# Patient Record
Sex: FEMALE | Race: WHITE | NOT HISPANIC OR LATINO | Employment: OTHER | URBAN - NONMETROPOLITAN AREA
[De-identification: names, ages, dates, MRNs, and addresses within clinical notes are randomized per-mention and may not be internally consistent; named-entity substitution may affect disease eponyms.]

---

## 2017-02-10 ENCOUNTER — OFFICE VISIT (OUTPATIENT)
Dept: CARDIOLOGY | Facility: CLINIC | Age: 77
End: 2017-02-10

## 2017-02-10 VITALS
OXYGEN SATURATION: 97 % | BODY MASS INDEX: 24.03 KG/M2 | DIASTOLIC BLOOD PRESSURE: 78 MMHG | WEIGHT: 130.6 LBS | HEIGHT: 62 IN | HEART RATE: 82 BPM | SYSTOLIC BLOOD PRESSURE: 124 MMHG

## 2017-02-10 DIAGNOSIS — E78.2 MIXED HYPERLIPIDEMIA: ICD-10-CM

## 2017-02-10 DIAGNOSIS — I10 ESSENTIAL HYPERTENSION: Primary | ICD-10-CM

## 2017-02-10 PROCEDURE — 99213 OFFICE O/P EST LOW 20 MIN: CPT | Performed by: INTERNAL MEDICINE

## 2017-02-10 RX ORDER — VALSARTAN 80 MG/1
80 TABLET ORAL DAILY
Qty: 90 TABLET | Refills: 1 | Status: SHIPPED | OUTPATIENT
Start: 2017-02-10 | End: 2017-02-10 | Stop reason: SDUPTHER

## 2017-02-10 RX ORDER — VALSARTAN 80 MG/1
80 TABLET ORAL DAILY
Qty: 90 TABLET | Refills: 1 | Status: SHIPPED | OUTPATIENT
Start: 2017-02-10 | End: 2017-05-26 | Stop reason: SDUPTHER

## 2017-02-10 NOTE — PROGRESS NOTES
subjective     Chief Complaint   Patient presents with   • Hypertension   • Hyperlipidemia   • Heartburn     History of Present Illness  Jayne Gonzales has long-standing essential hypertension for years. she is taking medications regularly. There are no medication side effects. Blood pressure is very well controlled. There has been no headache nausea chest pain. There has been no syncopal or presyncopal episode. she denies episodes of hypo-tension or accelerated hypertension.    Hyperlipidemia  Jayne Gonzales has long-standing history of hyperlipidemia. Has been trying to lose weight following diet and exercise. Patient is tolerating medications very well. There has been no side effects. Latest lipid levels have been good.    Patient also stated that she used to take the metoprolol with water pill which caused her to have kidney problems.  She is feeling better but still worried about her kidneys.  No recent lab work available.  We will get a copy from patient's PCP    Patient Active Problem List   Diagnosis   • Hypertension   • Hyperlipidemia   • GERD (gastroesophageal reflux disease)   • Arthritis   • Osteoporosis   • Left low back pain       Social History   Substance Use Topics   • Smoking status: Never Smoker   • Smokeless tobacco: Never Used   • Alcohol use No       Allergies   Allergen Reactions   • Codeine    • Sulfa Antibiotics          Current Outpatient Prescriptions:   •  ALPRAZolam (XANAX) 0.25 MG tablet, Take 0.25 mg by mouth at night as needed for anxiety., Disp: , Rfl:   •  atorvastatin (LIPITOR) 10 MG tablet, Take 10 mg by mouth daily., Disp: , Rfl:   •  Calcium-Magnesium-Vitamin D (CALCIUM 500 PO), Take  by mouth., Disp: , Rfl:   •  cholecalciferol (VITAMIN D3) 1000 UNITS tablet, Take 1,000 Units by mouth daily., Disp: , Rfl:   •  Cyanocobalamin (B-12 IJ), Inject  as directed., Disp: , Rfl:   •  HYDROcodone-acetaminophen (NORCO) 5-325 MG per tablet, Take 1 tablet by mouth 2 (two) times a  "day as needed., Disp: , Rfl:   •  ibandronate (BONIVA) 150 MG tablet, Take 150 mg by mouth every 30 (thirty) days., Disp: , Rfl:   •  lansoprazole (PREVACID) 30 MG capsule, Take 30 mg by mouth daily., Disp: , Rfl:   •  meloxicam (MOBIC) 15 MG tablet, Take 15 mg by mouth daily., Disp: , Rfl:   •  metoprolol succinate XL (TOPROL-XL) 25 MG 24 hr tablet, Take 25 mg by mouth daily., Disp: , Rfl:   •  valsartan (DIOVAN) 80 MG tablet, Take 1 tablet by mouth daily., Disp: 90 tablet, Rfl: 1      The following portions of the patient's history were reviewed and updated as appropriate: allergies, current medications, past family history, past medical history, past social history, past surgical history and problem list.    Review of Systems   Constitution: Negative.   HENT: Negative.    Eyes: Negative.    Cardiovascular: Negative.    Respiratory: Negative.    Hematologic/Lymphatic: Negative.    Musculoskeletal: Negative.    Gastrointestinal: Negative.    Neurological: Negative.           Objective:     Visit Vitals   • /78 (BP Location: Left arm, Patient Position: Sitting)   • Pulse 82   • Ht 62\" (157.5 cm)   • Wt 130 lb 9.6 oz (59.2 kg)   • SpO2 97%   • BMI 23.89 kg/m2     Physical Exam   Constitutional: She appears well-developed and well-nourished.   HENT:   Head: Normocephalic and atraumatic.   Mouth/Throat: Oropharynx is clear and moist.   Eyes: Conjunctivae and EOM are normal. Pupils are equal, round, and reactive to light. No scleral icterus.   Neck: Normal range of motion. Neck supple. No JVD present. No tracheal deviation present. No thyromegaly present.   Cardiovascular: Normal rate, regular rhythm, normal heart sounds and intact distal pulses.  Exam reveals no friction rub.    No murmur heard.  Pulmonary/Chest: Effort normal and breath sounds normal. No respiratory distress. She has no wheezes. She has no rales. She exhibits no tenderness.   Abdominal: Soft. Bowel sounds are normal. She exhibits no distension " and no mass. There is no tenderness. There is no rebound and no guarding.   Musculoskeletal: Normal range of motion. She exhibits no edema, tenderness or deformity.   Lymphadenopathy:     She has no cervical adenopathy.   Neurological: She is alert. She has normal reflexes. No cranial nerve deficit. She exhibits normal muscle tone. Coordination normal.   Skin: Skin is warm and dry.   Psychiatric: She has a normal mood and affect. Her behavior is normal. Judgment and thought content normal.         Lab Review  No lab available we will get a copy from patient's PCP    Procedures     I personally viewed and interpreted the patient's LAB data         Assessment:     1. Essential hypertension    2. Mixed hyperlipidemia          Plan:      blood pressure is very well controlled  Diovan prescription was sent  Lipid control is unknown patient is taking Lipitor 10 mg daily we will try to get copy from patient's PCP  Patient is also tolerating metoprolol ER 25 very well.  Aggressive risk factor modifications again discussed        No Follow-up on file.

## 2017-05-26 ENCOUNTER — OFFICE VISIT (OUTPATIENT)
Dept: CARDIOLOGY | Facility: CLINIC | Age: 77
End: 2017-05-26

## 2017-05-26 VITALS
DIASTOLIC BLOOD PRESSURE: 64 MMHG | BODY MASS INDEX: 24.77 KG/M2 | WEIGHT: 134.6 LBS | OXYGEN SATURATION: 95 % | SYSTOLIC BLOOD PRESSURE: 102 MMHG | HEIGHT: 62 IN | HEART RATE: 66 BPM

## 2017-05-26 DIAGNOSIS — K21.9 GASTROESOPHAGEAL REFLUX DISEASE WITHOUT ESOPHAGITIS: ICD-10-CM

## 2017-05-26 DIAGNOSIS — I10 ESSENTIAL HYPERTENSION: ICD-10-CM

## 2017-05-26 DIAGNOSIS — E78.2 MIXED HYPERLIPIDEMIA: Primary | ICD-10-CM

## 2017-05-26 PROCEDURE — 99213 OFFICE O/P EST LOW 20 MIN: CPT | Performed by: INTERNAL MEDICINE

## 2017-05-26 RX ORDER — VALSARTAN 40 MG/1
40 TABLET ORAL DAILY
Qty: 90 TABLET | Refills: 3 | Status: SHIPPED | OUTPATIENT
Start: 2017-05-26 | End: 2017-07-21

## 2017-05-26 RX ORDER — VALSARTAN 80 MG/1
80 TABLET ORAL DAILY
Qty: 90 TABLET | Refills: 1 | Status: SHIPPED | OUTPATIENT
Start: 2017-05-26 | End: 2017-05-26

## 2017-07-12 ENCOUNTER — TRANSCRIBE ORDERS (OUTPATIENT)
Dept: ADMINISTRATIVE | Facility: HOSPITAL | Age: 77
End: 2017-07-12

## 2017-07-12 DIAGNOSIS — Z12.31 VISIT FOR SCREENING MAMMOGRAM: Primary | ICD-10-CM

## 2017-07-21 ENCOUNTER — TELEPHONE (OUTPATIENT)
Dept: CARDIOLOGY | Facility: CLINIC | Age: 77
End: 2017-07-21

## 2017-07-21 RX ORDER — VALSARTAN 80 MG/1
80 TABLET ORAL DAILY
Qty: 90 TABLET | Refills: 0 | Status: SHIPPED | OUTPATIENT
Start: 2017-07-21 | End: 2017-08-07

## 2017-07-21 NOTE — TELEPHONE ENCOUNTER
----- Message from Georgette Pacheco MD sent at 7/21/2017 12:54 PM EDT -----  Increase valsartan to 80 mg daily  ----- Message -----     From: Kayla Engel MA     Sent: 7/21/2017  10:14 AM       To: Georgette Pacheco MD    Pt has been taking valsartan 40mg since last visit and states her b/p has been running very high. PCP gave her hydralazine 10mg qd.   She states these are her last 3 b/p readings at home  213/104  199/85  203/89  Wants to know if she should come in and be seen or go back to taking valsartan 80?

## 2017-07-23 ENCOUNTER — APPOINTMENT (OUTPATIENT)
Dept: CT IMAGING | Facility: HOSPITAL | Age: 77
End: 2017-07-23

## 2017-07-23 ENCOUNTER — APPOINTMENT (OUTPATIENT)
Dept: GENERAL RADIOLOGY | Facility: HOSPITAL | Age: 77
End: 2017-07-23

## 2017-07-23 ENCOUNTER — HOSPITAL ENCOUNTER (EMERGENCY)
Facility: HOSPITAL | Age: 77
Discharge: HOME | End: 2017-07-23
Attending: FAMILY MEDICINE | Admitting: FAMILY MEDICINE

## 2017-07-23 VITALS
OXYGEN SATURATION: 98 % | HEART RATE: 100 BPM | SYSTOLIC BLOOD PRESSURE: 150 MMHG | TEMPERATURE: 98.6 F | WEIGHT: 129 LBS | RESPIRATION RATE: 18 BRPM | HEIGHT: 62 IN | BODY MASS INDEX: 23.74 KG/M2 | DIASTOLIC BLOOD PRESSURE: 73 MMHG

## 2017-07-23 DIAGNOSIS — I10 ELEVATED BLOOD PRESSURE READING WITH DIAGNOSIS OF HYPERTENSION: Primary | ICD-10-CM

## 2017-07-23 DIAGNOSIS — R07.9 CHEST PAIN IN ADULT: ICD-10-CM

## 2017-07-23 LAB
ALBUMIN SERPL-MCNC: 4.4 G/DL (ref 3.4–4.8)
ALBUMIN/GLOB SERPL: 1.5 G/DL (ref 1.5–2.5)
ALP SERPL-CCNC: 56 U/L (ref 35–104)
ALT SERPL W P-5'-P-CCNC: 13 U/L (ref 10–36)
ANION GAP SERPL CALCULATED.3IONS-SCNC: 3.1 MMOL/L (ref 3.6–11.2)
AST SERPL-CCNC: 20 U/L (ref 10–30)
BASOPHILS # BLD AUTO: 0.04 10*3/MM3 (ref 0–0.3)
BASOPHILS NFR BLD AUTO: 0.6 % (ref 0–2)
BILIRUB SERPL-MCNC: 0.3 MG/DL (ref 0.2–1.8)
BNP SERPL-MCNC: 66 PG/ML (ref 0–100)
BUN BLD-MCNC: 14 MG/DL (ref 7–21)
BUN/CREAT SERPL: 13.1 (ref 7–25)
CALCIUM SPEC-SCNC: 9.7 MG/DL (ref 7.7–10)
CHLORIDE SERPL-SCNC: 108 MMOL/L (ref 99–112)
CK MB SERPL-CCNC: 1.99 NG/ML (ref 0–5)
CK MB SERPL-RTO: 3.1 % (ref 0–3)
CK SERPL-CCNC: 64 U/L (ref 24–173)
CO2 SERPL-SCNC: 30.9 MMOL/L (ref 24.3–31.9)
CREAT BLD-MCNC: 1.07 MG/DL (ref 0.43–1.29)
DEPRECATED RDW RBC AUTO: 40 FL (ref 37–54)
EOSINOPHIL # BLD AUTO: 0.13 10*3/MM3 (ref 0–0.7)
EOSINOPHIL NFR BLD AUTO: 1.8 % (ref 0–7)
ERYTHROCYTE [DISTWIDTH] IN BLOOD BY AUTOMATED COUNT: 11.6 % (ref 11.5–14.5)
GFR SERPL CREATININE-BSD FRML MDRD: 50 ML/MIN/1.73
GLOBULIN UR ELPH-MCNC: 2.9 GM/DL
GLUCOSE BLD-MCNC: 112 MG/DL (ref 70–110)
HCT VFR BLD AUTO: 37.5 % (ref 37–47)
HGB BLD-MCNC: 12.1 G/DL (ref 12–16)
HOLD SPECIMEN: NORMAL
HOLD SPECIMEN: NORMAL
IMM GRANULOCYTES # BLD: 0.01 10*3/MM3 (ref 0–0.03)
IMM GRANULOCYTES NFR BLD: 0.1 % (ref 0–0.5)
LYMPHOCYTES # BLD AUTO: 1.96 10*3/MM3 (ref 1–3)
LYMPHOCYTES NFR BLD AUTO: 27.2 % (ref 16–46)
MCH RBC QN AUTO: 31.3 PG (ref 27–33)
MCHC RBC AUTO-ENTMCNC: 32.3 G/DL (ref 33–37)
MCV RBC AUTO: 96.9 FL (ref 80–94)
MONOCYTES # BLD AUTO: 0.71 10*3/MM3 (ref 0.1–0.9)
MONOCYTES NFR BLD AUTO: 9.9 % (ref 0–12)
NEUTROPHILS # BLD AUTO: 4.35 10*3/MM3 (ref 1.4–6.5)
NEUTROPHILS NFR BLD AUTO: 60.4 % (ref 40–75)
OSMOLALITY SERPL CALC.SUM OF ELEC: 284.3 MOSM/KG (ref 273–305)
PLATELET # BLD AUTO: 291 10*3/MM3 (ref 130–400)
PMV BLD AUTO: 10.5 FL (ref 6–10)
POTASSIUM BLD-SCNC: 4.5 MMOL/L (ref 3.5–5.3)
PROT SERPL-MCNC: 7.3 G/DL (ref 6–8)
RBC # BLD AUTO: 3.87 10*6/MM3 (ref 4.2–5.4)
SODIUM BLD-SCNC: 142 MMOL/L (ref 135–153)
TROPONIN I SERPL-MCNC: 0.01 NG/ML
TROPONIN I SERPL-MCNC: <0.006 NG/ML
WBC NRBC COR # BLD: 7.2 10*3/MM3 (ref 4.5–12.5)
WHOLE BLOOD HOLD SPECIMEN: NORMAL
WHOLE BLOOD HOLD SPECIMEN: NORMAL

## 2017-07-23 PROCEDURE — 71020 XR CHEST 2 VW: CPT | Performed by: RADIOLOGY

## 2017-07-23 PROCEDURE — 96374 THER/PROPH/DIAG INJ IV PUSH: CPT

## 2017-07-23 PROCEDURE — 83880 ASSAY OF NATRIURETIC PEPTIDE: CPT | Performed by: FAMILY MEDICINE

## 2017-07-23 PROCEDURE — 96376 TX/PRO/DX INJ SAME DRUG ADON: CPT

## 2017-07-23 PROCEDURE — 93005 ELECTROCARDIOGRAM TRACING: CPT | Performed by: FAMILY MEDICINE

## 2017-07-23 PROCEDURE — 85025 COMPLETE CBC W/AUTO DIFF WBC: CPT | Performed by: FAMILY MEDICINE

## 2017-07-23 PROCEDURE — 71260 CT THORAX DX C+: CPT

## 2017-07-23 PROCEDURE — 71020 HC CHEST PA AND LATERAL: CPT

## 2017-07-23 PROCEDURE — 84484 ASSAY OF TROPONIN QUANT: CPT | Performed by: FAMILY MEDICINE

## 2017-07-23 PROCEDURE — 71260 CT THORAX DX C+: CPT | Performed by: RADIOLOGY

## 2017-07-23 PROCEDURE — 96375 TX/PRO/DX INJ NEW DRUG ADDON: CPT

## 2017-07-23 PROCEDURE — 93010 ELECTROCARDIOGRAM REPORT: CPT | Performed by: INTERNAL MEDICINE

## 2017-07-23 PROCEDURE — 0 IOPAMIDOL 61 % SOLUTION: Performed by: FAMILY MEDICINE

## 2017-07-23 PROCEDURE — 74176 CT ABD & PELVIS W/O CONTRAST: CPT

## 2017-07-23 PROCEDURE — 82550 ASSAY OF CK (CPK): CPT | Performed by: FAMILY MEDICINE

## 2017-07-23 PROCEDURE — 82553 CREATINE MB FRACTION: CPT | Performed by: FAMILY MEDICINE

## 2017-07-23 PROCEDURE — 80053 COMPREHEN METABOLIC PANEL: CPT | Performed by: FAMILY MEDICINE

## 2017-07-23 PROCEDURE — 25010000002 ONDANSETRON PER 1 MG: Performed by: FAMILY MEDICINE

## 2017-07-23 PROCEDURE — 25010000002 HYDRALAZINE PER 20 MG: Performed by: FAMILY MEDICINE

## 2017-07-23 PROCEDURE — 74176 CT ABD & PELVIS W/O CONTRAST: CPT | Performed by: RADIOLOGY

## 2017-07-23 PROCEDURE — 99283 EMERGENCY DEPT VISIT LOW MDM: CPT

## 2017-07-23 RX ORDER — SODIUM CHLORIDE 0.9 % (FLUSH) 0.9 %
10 SYRINGE (ML) INJECTION AS NEEDED
Status: DISCONTINUED | OUTPATIENT
Start: 2017-07-23 | End: 2017-07-24 | Stop reason: HOSPADM

## 2017-07-23 RX ORDER — ONDANSETRON 4 MG/1
4 TABLET, FILM COATED ORAL EVERY 6 HOURS
Qty: 10 TABLET | Refills: 1 | Status: SHIPPED | OUTPATIENT
Start: 2017-07-23

## 2017-07-23 RX ORDER — AZITHROMYCIN 250 MG/1
500 TABLET, FILM COATED ORAL ONCE
Status: COMPLETED | OUTPATIENT
Start: 2017-07-23 | End: 2017-07-23

## 2017-07-23 RX ORDER — PANTOPRAZOLE SODIUM 40 MG/1
40 TABLET, DELAYED RELEASE ORAL DAILY
Qty: 30 TABLET | Refills: 0 | Status: SHIPPED | OUTPATIENT
Start: 2017-07-23 | End: 2017-08-22

## 2017-07-23 RX ORDER — HYDRALAZINE HYDROCHLORIDE 20 MG/ML
20 INJECTION INTRAMUSCULAR; INTRAVENOUS ONCE
Status: COMPLETED | OUTPATIENT
Start: 2017-07-23 | End: 2017-07-23

## 2017-07-23 RX ORDER — FAMOTIDINE 10 MG/ML
20 INJECTION, SOLUTION INTRAVENOUS ONCE
Status: COMPLETED | OUTPATIENT
Start: 2017-07-23 | End: 2017-07-23

## 2017-07-23 RX ORDER — CEFDINIR 300 MG/1
300 CAPSULE ORAL 2 TIMES DAILY
Qty: 20 CAPSULE | Refills: 0 | Status: SHIPPED | OUTPATIENT
Start: 2017-07-23 | End: 2017-08-02

## 2017-07-23 RX ORDER — ASPIRIN 81 MG/1
324 TABLET, CHEWABLE ORAL ONCE
Status: COMPLETED | OUTPATIENT
Start: 2017-07-23 | End: 2017-07-23

## 2017-07-23 RX ORDER — ONDANSETRON 2 MG/ML
4 INJECTION INTRAMUSCULAR; INTRAVENOUS ONCE
Status: COMPLETED | OUTPATIENT
Start: 2017-07-23 | End: 2017-07-23

## 2017-07-23 RX ORDER — FAMOTIDINE 10 MG/ML
20 INJECTION, SOLUTION INTRAVENOUS ONCE
Status: DISCONTINUED | OUTPATIENT
Start: 2017-07-23 | End: 2017-07-23

## 2017-07-23 RX ORDER — HYDRALAZINE HYDROCHLORIDE 20 MG/ML
10 INJECTION INTRAMUSCULAR; INTRAVENOUS ONCE
Status: COMPLETED | OUTPATIENT
Start: 2017-07-23 | End: 2017-07-23

## 2017-07-23 RX ADMIN — IOPAMIDOL 90 ML: 612 INJECTION, SOLUTION INTRAVENOUS at 20:25

## 2017-07-23 RX ADMIN — HYDRALAZINE HYDROCHLORIDE 10 MG: 20 INJECTION INTRAMUSCULAR; INTRAVENOUS at 19:54

## 2017-07-23 RX ADMIN — HYDRALAZINE HYDROCHLORIDE 20 MG: 20 INJECTION INTRAMUSCULAR; INTRAVENOUS at 20:35

## 2017-07-23 RX ADMIN — AZITHROMYCIN 500 MG: 250 TABLET, FILM COATED ORAL at 21:38

## 2017-07-23 RX ADMIN — ASPIRIN 324 MG: 81 TABLET, CHEWABLE ORAL at 20:30

## 2017-07-23 RX ADMIN — FAMOTIDINE 20 MG: 10 INJECTION, SOLUTION INTRAVENOUS at 21:18

## 2017-07-23 RX ADMIN — ONDANSETRON 4 MG: 2 INJECTION, SOLUTION INTRAMUSCULAR; INTRAVENOUS at 21:18

## 2017-07-23 NOTE — ED PROVIDER NOTES
Subjective   History of Present Illness  76 y/o F here w/h/o HTN and HLD and new onset of substernal CP. Pt states that her BP has been running high and she has had her medications changed recently as well. Pt states that her CP started earlier today. Pt is currently on metoprolol and diovan for BP control. Pt states that she also takes hydralazine 10 mg TID that was started by her PCP one week PTP, last dose of hydralazine was this AM. Pt denies any numbness/tingling. No vision changes, no HA.  Review of Systems   Constitutional: Negative for chills, diaphoresis, fatigue and fever.   Eyes: Negative for photophobia and visual disturbance.   Respiratory: Negative for cough, chest tightness, shortness of breath and wheezing.    Cardiovascular: Positive for chest pain. Negative for palpitations and leg swelling.        +elevated BP readings with a h/o HTN   Gastrointestinal: Negative for abdominal distention, abdominal pain, nausea and vomiting.   Genitourinary: Negative for difficulty urinating, dysuria, flank pain, frequency and urgency.   Musculoskeletal: Negative for neck pain and neck stiffness.   Skin: Negative for color change and pallor.   All other systems reviewed and are negative.      Past Medical History:   Diagnosis Date   • Cancer     skin   • GERD (gastroesophageal reflux disease)    • Hyperlipidemia    • Hypertension        Allergies   Allergen Reactions   • Codeine    • Sulfa Antibiotics        Past Surgical History:   Procedure Laterality Date   • BREAST LUMPECTOMY     • HYSTERECTOMY     • SKIN CANCER EXCISION         Family History   Problem Relation Age of Onset   • Heart attack Mother    • Heart disease Mother    • Heart failure Mother    • Heart attack Father    • Heart disease Father    • Heart failure Father    • No Known Problems Sister    • Emphysema Brother        Social History     Social History   • Marital status:      Spouse name: N/A   • Number of children: N/A   • Years of  education: N/A     Social History Main Topics   • Smoking status: Never Smoker   • Smokeless tobacco: Never Used   • Alcohol use No   • Drug use: No   • Sexual activity: Not Asked     Other Topics Concern   • None     Social History Narrative           Objective   Physical Exam   Constitutional: She is oriented to person, place, and time. She appears well-developed and well-nourished. She is active.   HENT:   Head: Normocephalic and atraumatic.   Right Ear: Hearing and external ear normal.   Left Ear: Hearing and external ear normal.   Nose: Nose normal.   Mouth/Throat: Uvula is midline, oropharynx is clear and moist and mucous membranes are normal.   Eyes: Conjunctivae, EOM and lids are normal. Pupils are equal, round, and reactive to light.   Neck: Trachea normal, normal range of motion, full passive range of motion without pain and phonation normal. Neck supple.   Cardiovascular: Normal rate, regular rhythm and normal heart sounds.    +elevated BP reading   Pulmonary/Chest: Effort normal and breath sounds normal.   Abdominal: Soft. Normal appearance. There is no tenderness.   Neurological: She is alert and oriented to person, place, and time. GCS eye subscore is 4. GCS verbal subscore is 5. GCS motor subscore is 6.   Skin: Skin is warm, dry and intact.   Psychiatric: She has a normal mood and affect. Her speech is normal and behavior is normal. Judgment and thought content normal. Cognition and memory are normal.   Nursing note and vitals reviewed.      Procedures         ED Course  ED Course   Comment By Time   NSR, 79 bpm. QTc 421ms. No ST segment abnormalities concerning for STEMI. Quinton Schaffer MD 07/23 1851      EKG negative for STEMI and pt's Tn negative x 2. Pt CP completely resolved shortly after presentation to the ER. Pt with CT chest findings concerning for RML pneumonia. Pt started on azithromycin here and sent home with prescription. Pt notified on CT abd/pelvis results and need to f/u  with GI. Pt also informed of need to undergo outpt stress test since she refuses admission and f/u with Dr Pacheco who is managing her HTN. Pt BP decreased to 148/81 on exam immediately prior to discharge.            MDM  Number of Diagnoses or Management Options  Chest pain in adult: new and requires workup  Elevated blood pressure reading with diagnosis of hypertension: new and requires workup     Amount and/or Complexity of Data Reviewed  Clinical lab tests: reviewed and ordered  Tests in the radiology section of CPT®: reviewed and ordered  Tests in the medicine section of CPT®: reviewed and ordered  Decide to obtain previous medical records or to obtain history from someone other than the patient: yes  Independent visualization of images, tracings, or specimens: yes    Risk of Complications, Morbidity, and/or Mortality  Presenting problems: high  Diagnostic procedures: high  Management options: high    Patient Progress  Patient progress: stable      Final diagnoses:   Elevated blood pressure reading with diagnosis of hypertension   Chest pain in adult            Quinton Schaffer MD  07/23/17 2412

## 2017-07-24 ENCOUNTER — OFFICE VISIT (OUTPATIENT)
Dept: CARDIOLOGY | Facility: CLINIC | Age: 77
End: 2017-07-24

## 2017-07-24 VITALS
WEIGHT: 131.2 LBS | SYSTOLIC BLOOD PRESSURE: 102 MMHG | DIASTOLIC BLOOD PRESSURE: 50 MMHG | HEART RATE: 81 BPM | BODY MASS INDEX: 24.14 KG/M2 | OXYGEN SATURATION: 98 % | HEIGHT: 62 IN

## 2017-07-24 DIAGNOSIS — E78.2 MIXED HYPERLIPIDEMIA: ICD-10-CM

## 2017-07-24 DIAGNOSIS — R07.9 CHEST PAIN IN ADULT: ICD-10-CM

## 2017-07-24 DIAGNOSIS — I10 ESSENTIAL HYPERTENSION: Primary | ICD-10-CM

## 2017-07-24 PROCEDURE — 99213 OFFICE O/P EST LOW 20 MIN: CPT | Performed by: INTERNAL MEDICINE

## 2017-07-24 PROCEDURE — 93000 ELECTROCARDIOGRAM COMPLETE: CPT | Performed by: INTERNAL MEDICINE

## 2017-07-24 NOTE — PROGRESS NOTES
subjective     Chief Complaint   Patient presents with   • Follow-up     ER FOLLOW UP FOR BP   • Hypertension   • Hyperlipidemia   • Dizziness     History of Present Illness   Patient states that yesterday she had to go to the emergency room because she was having mild fullness in the chest and blood pressure was elevated.  In the emergency room her EKG and cardiac enzymes were normal.  She was told that she may have bronchitis and was sent home on antibiotics.  She was also told to see Dr. Kelly because of nausea and was given Zofran and Protonix.  Patient did not start any of those medications.  She also was given hydralazine to bring the blood pressure down.  Today blood pressure is actually low and patient is asymptomatic.  Patient takes hydralazine 10 mg 3 times a day only on when necessary basis.    Hyperlipidemia  Jayne Gonzales has long-standing history of hyperlipidemia. Has been trying to lose weight following diet and exercise. Patient is tolerating medications very well. There has been no side effects. Latest lipid levels have been good.       Past Surgical History:   Procedure Laterality Date   • BREAST LUMPECTOMY     • CARDIOVASCULAR STRESS TEST  02/10/2016   • ECHO - CONVERTED  02/10/2016   • HYSTERECTOMY     • SKIN CANCER EXCISION       Family History   Problem Relation Age of Onset   • Heart attack Mother    • Heart disease Mother    • Heart failure Mother    • Heart attack Father    • Heart disease Father    • Heart failure Father    • No Known Problems Sister    • Emphysema Brother      Past Medical History:   Diagnosis Date   • Cancer     skin   • GERD (gastroesophageal reflux disease)    • Hyperlipidemia    • Hypertension      Patient Active Problem List   Diagnosis   • Hypertension   • Hyperlipidemia   • GERD (gastroesophageal reflux disease)   • Arthritis   • Osteoporosis   • Left low back pain       Social History   Substance Use Topics   • Smoking status: Never Smoker   • Smokeless  tobacco: Never Used   • Alcohol use No       Allergies   Allergen Reactions   • Codeine    • Sulfa Antibiotics        Current Outpatient Prescriptions on File Prior to Visit   Medication Sig   • ALPRAZolam (XANAX) 0.25 MG tablet Take 0.25 mg by mouth at night as needed for anxiety.   • atorvastatin (LIPITOR) 10 MG tablet Take 10 mg by mouth daily.   • Calcium-Magnesium-Vitamin D (CALCIUM 500 PO) Take  by mouth.   • cefdinir (OMNICEF) 300 MG capsule Take 1 capsule by mouth 2 (Two) Times a Day for 10 days.   • cholecalciferol (VITAMIN D3) 1000 UNITS tablet Take 1,000 Units by mouth daily.   • Cyanocobalamin (B-12 IJ) Inject  as directed.   • HYDROcodone-acetaminophen (NORCO) 5-325 MG per tablet Take 1 tablet by mouth 2 (two) times a day as needed.   • lansoprazole (PREVACID) 30 MG capsule Take 30 mg by mouth daily.   • meloxicam (MOBIC) 15 MG tablet Take 15 mg by mouth daily.   • metoprolol succinate XL (TOPROL-XL) 25 MG 24 hr tablet Take 25 mg by mouth daily.   • ondansetron (ZOFRAN) 4 MG tablet Take 1 tablet by mouth Every 6 (Six) Hours. PRN Nausea/vomiting   • valsartan (DIOVAN) 80 MG tablet Take 1 tablet by mouth Daily. (Patient taking differently: Take 80 mg by mouth Daily. Pt reports she takes 80mg in the morning and 40mg at night)   • ibandronate (BONIVA) 150 MG tablet Take 150 mg by mouth every 30 (thirty) days.   • pantoprazole (PROTONIX) 40 MG EC tablet Take 1 tablet by mouth Daily for 30 days.     No current facility-administered medications on file prior to visit.          The following portions of the patient's history were reviewed and updated as appropriate: allergies, current medications, past family history, past medical history, past social history, past surgical history and problem list.    Review of Systems   Constitution: Negative.   HENT: Negative.  Negative for congestion and headaches.    Eyes: Negative.    Cardiovascular: Negative.  Negative for chest pain, cyanosis, dyspnea on exertion,  "irregular heartbeat, leg swelling, near-syncope, orthopnea, palpitations, paroxysmal nocturnal dyspnea and syncope.   Respiratory: Negative.  Negative for shortness of breath.    Hematologic/Lymphatic: Negative.    Musculoskeletal: Negative.    Gastrointestinal: Negative.    Neurological: Negative.           Objective:     /50 (BP Location: Left arm, Patient Position: Sitting)  Pulse 81  Ht 62\" (157.5 cm)  Wt 131 lb 3.2 oz (59.5 kg)  SpO2 98%  BMI 24 kg/m2  Physical Exam   Constitutional: She appears well-developed and well-nourished.   HENT:   Head: Normocephalic and atraumatic.   Mouth/Throat: Oropharynx is clear and moist.   Eyes: Conjunctivae and EOM are normal. Pupils are equal, round, and reactive to light. No scleral icterus.   Neck: Normal range of motion. Neck supple. No JVD present. No tracheal deviation present. No thyromegaly present.   Cardiovascular: Normal rate, regular rhythm, normal heart sounds and intact distal pulses.  Exam reveals no friction rub.    No murmur heard.  Pulmonary/Chest: Effort normal and breath sounds normal. No respiratory distress. She has no wheezes. She has no rales. She exhibits no tenderness.   Abdominal: Soft. Bowel sounds are normal. She exhibits no distension and no mass. There is no tenderness. There is no rebound and no guarding.   Musculoskeletal: Normal range of motion. She exhibits no edema, tenderness or deformity.   Lymphadenopathy:     She has no cervical adenopathy.   Neurological: She is alert. She has normal reflexes. No cranial nerve deficit. She exhibits normal muscle tone. Coordination normal.   Skin: Skin is warm and dry.   Psychiatric: She has a normal mood and affect. Her behavior is normal. Judgment and thought content normal.         Lab Review  Lab Results   Component Value Date     07/23/2017    K 4.5 07/23/2017     07/23/2017    BUN 14 07/23/2017    CREATININE 1.07 07/23/2017    GLUCOSE 112 (H) 07/23/2017    CALCIUM 9.7 " 07/23/2017    ALT 13 07/23/2017    ALKPHOS 56 07/23/2017    LABIL2 1.5 07/23/2017     Lab Results   Component Value Date    CKTOTAL 64 07/23/2017     Lab Results   Component Value Date    WBC 7.20 07/23/2017    HGB 12.1 07/23/2017    HCT 37.5 07/23/2017     07/23/2017     No results found for: INR  No results found for: MG  No results found for: PSA, TSH  Lab Results   Component Value Date    BNP 66.0 07/23/2017   Lipid panel February 17        ECG 12 Lead  Date/Time: 7/25/2017 12:37 PM  Performed by: YAEL RAO  Authorized by: YAEL RAO   Rhythm: sinus rhythm  Rate: normal  Conduction: conduction normal  ST Segments: ST segments normal  T Waves: T waves normal  QRS axis: normal  Clinical impression: abnormal ECG and myocardial infarction  Comments: 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  No acute changes               I personally viewed and interpreted the patient's LAB data         Assessment:     1. Essential hypertension    2. Chest pain in adult    3. Mixed hyperlipidemia          Plan:      Blood pressure is very well controlled today.  She will continue current medications  Because of chest pain further cardiac workup was indicated.  A treadmill stress test has been scheduled.  Patient will return to the emergency room if she develops chest pain        No Follow-up on file.

## 2017-07-25 ENCOUNTER — TRANSCRIBE ORDERS (OUTPATIENT)
Dept: ADMINISTRATIVE | Facility: HOSPITAL | Age: 77
End: 2017-07-25

## 2017-07-25 DIAGNOSIS — R07.9 CHEST PAIN, UNSPECIFIED: Primary | ICD-10-CM

## 2017-08-01 ENCOUNTER — HOSPITAL ENCOUNTER (OUTPATIENT)
Dept: CARDIOLOGY | Facility: HOSPITAL | Age: 77
Discharge: HOME | End: 2017-08-01
Attending: INTERNAL MEDICINE

## 2017-08-01 ENCOUNTER — TELEPHONE (OUTPATIENT)
Dept: CARDIOLOGY | Facility: CLINIC | Age: 77
End: 2017-08-01

## 2017-08-01 DIAGNOSIS — R07.9 CHEST PAIN, UNSPECIFIED: ICD-10-CM

## 2017-08-01 DIAGNOSIS — R07.9 CHEST PAIN IN ADULT: Primary | ICD-10-CM

## 2017-08-01 NOTE — TELEPHONE ENCOUNTER
Tech called states Ms. Gonzales went for her stress test this morning and could not get b/p down enough to do walking stress test. linwood ordered by Dr. Pacheco per Radha

## 2017-08-03 ENCOUNTER — HOSPITAL ENCOUNTER (OUTPATIENT)
Dept: CARDIOLOGY | Facility: HOSPITAL | Age: 77
Discharge: HOME | End: 2017-08-03
Attending: INTERNAL MEDICINE

## 2017-08-03 ENCOUNTER — HOSPITAL ENCOUNTER (OUTPATIENT)
Dept: NUCLEAR MEDICINE | Facility: HOSPITAL | Age: 77
Discharge: HOME | End: 2017-08-03
Attending: INTERNAL MEDICINE

## 2017-08-03 DIAGNOSIS — R07.9 CHEST PAIN IN ADULT: ICD-10-CM

## 2017-08-03 PROCEDURE — 25010000002 AMINOPHYLLINE PER 250 MG: Performed by: INTERNAL MEDICINE

## 2017-08-03 PROCEDURE — 0 TECHNETIUM SESTAMIBI: Performed by: INTERNAL MEDICINE

## 2017-08-03 PROCEDURE — 78452 HT MUSCLE IMAGE SPECT MULT: CPT | Performed by: INTERNAL MEDICINE

## 2017-08-03 PROCEDURE — A9500 TC99M SESTAMIBI: HCPCS | Performed by: INTERNAL MEDICINE

## 2017-08-03 PROCEDURE — 78452 HT MUSCLE IMAGE SPECT MULT: CPT

## 2017-08-03 PROCEDURE — 93018 CV STRESS TEST I&R ONLY: CPT | Performed by: INTERNAL MEDICINE

## 2017-08-03 PROCEDURE — 93017 CV STRESS TEST TRACING ONLY: CPT

## 2017-08-03 PROCEDURE — 25010000002 REGADENOSON 0.4 MG/5ML SOLUTION: Performed by: INTERNAL MEDICINE

## 2017-08-03 RX ORDER — AMINOPHYLLINE DIHYDRATE 25 MG/ML
125 INJECTION, SOLUTION INTRAVENOUS
Status: COMPLETED | OUTPATIENT
Start: 2017-08-03 | End: 2017-08-03

## 2017-08-03 RX ADMIN — REGADENOSON 0.4 MG: 0.08 INJECTION, SOLUTION INTRAVENOUS at 10:06

## 2017-08-03 RX ADMIN — TECHNETIUM TC-99M SESTAMIBI 1 DOSE: 1 INJECTION INTRAVENOUS at 07:55

## 2017-08-03 RX ADMIN — TECHNETIUM TC-99M SESTAMIBI 1 DOSE: 1 INJECTION INTRAVENOUS at 10:06

## 2017-08-03 RX ADMIN — AMINOPHYLLINE 125 MG: 25 INJECTION, SOLUTION INTRAVENOUS at 10:13

## 2017-08-04 ENCOUNTER — TELEPHONE (OUTPATIENT)
Dept: CARDIOLOGY | Facility: CLINIC | Age: 77
End: 2017-08-04

## 2017-08-04 LAB
BH CV NUCLEAR PRIOR STUDY: 3
BH CV STRESS BP STAGE 1: NORMAL
BH CV STRESS BP STAGE 2: NORMAL
BH CV STRESS COMMENTS STAGE 1: NORMAL
BH CV STRESS COMMENTS STAGE 2: NORMAL
BH CV STRESS DOSE REGADENOSON STAGE 1: 0.4
BH CV STRESS DURATION MIN STAGE 1: 0
BH CV STRESS DURATION MIN STAGE 2: 4
BH CV STRESS DURATION SEC STAGE 1: 15
BH CV STRESS DURATION SEC STAGE 2: 0
BH CV STRESS HR STAGE 1: 84
BH CV STRESS HR STAGE 2: 62
BH CV STRESS PROTOCOL 1: NORMAL
BH CV STRESS RECOVERY BP: NORMAL MMHG
BH CV STRESS RECOVERY HR: 63 BPM
BH CV STRESS STAGE 1: 1
BH CV STRESS STAGE 2: 2
MAXIMAL PREDICTED HEART RATE: 142 BPM
PERCENT MAX PREDICTED HR: 59.15 %
STRESS BASELINE BP: NORMAL MMHG
STRESS BASELINE HR: 57 BPM
STRESS PERCENT HR: 70 %
STRESS POST PEAK BP: NORMAL MMHG
STRESS POST PEAK HR: 84 BPM
STRESS TARGET HR: 121 BPM

## 2017-08-04 NOTE — TELEPHONE ENCOUNTER
Stress test is normal.  Keep taking all the medications.  Add aspirin 81 daily if not taking any aspirin

## 2017-08-07 ENCOUNTER — TELEPHONE (OUTPATIENT)
Dept: CARDIOLOGY | Facility: CLINIC | Age: 77
End: 2017-08-07

## 2017-08-07 RX ORDER — HYDRALAZINE HYDROCHLORIDE 10 MG/1
20 TABLET, FILM COATED ORAL 3 TIMES DAILY
Qty: 180 TABLET | Refills: 0 | Status: SHIPPED | OUTPATIENT
Start: 2017-08-07 | End: 2017-08-16 | Stop reason: SDUPTHER

## 2017-08-07 RX ORDER — VALSARTAN 80 MG/1
80 TABLET ORAL 2 TIMES DAILY
Qty: 60 TABLET | Refills: 0 | Status: SHIPPED | OUTPATIENT
Start: 2017-08-07 | End: 2017-08-16 | Stop reason: SDUPTHER

## 2017-08-07 NOTE — TELEPHONE ENCOUNTER
----- Message from Georgette Pacheco MD sent at 8/4/2017  4:17 PM EDT -----  Increase Diovan 80 mg twice a day and increase hydralazine 20 milligrams 3 times a day  ----- Message -----     From: Kayla Engel MA     Sent: 8/4/2017   1:48 PM       To: Georgette Pacheco MD    Pt states her b/p has been running high in the mornings. She takes diovan 80mg in the morning and 40mg in the evening. She is also taking hydralazine 10 tid that is not on her med list. 175/82 200/107 these were her readings the last two mornings before she takes her meds

## 2017-08-16 RX ORDER — VALSARTAN 80 MG/1
80 TABLET ORAL 2 TIMES DAILY
Qty: 180 TABLET | Refills: 0 | Status: SHIPPED | OUTPATIENT
Start: 2017-08-16 | End: 2017-11-10 | Stop reason: SDUPTHER

## 2017-08-16 RX ORDER — HYDRALAZINE HYDROCHLORIDE 10 MG/1
20 TABLET, FILM COATED ORAL 3 TIMES DAILY
Qty: 180 TABLET | Refills: 0 | Status: SHIPPED | OUTPATIENT
Start: 2017-08-16 | End: 2017-11-10 | Stop reason: SDUPTHER

## 2017-11-10 ENCOUNTER — OFFICE VISIT (OUTPATIENT)
Dept: CARDIOLOGY | Facility: CLINIC | Age: 77
End: 2017-11-10

## 2017-11-10 VITALS
WEIGHT: 135 LBS | HEART RATE: 70 BPM | HEIGHT: 62 IN | SYSTOLIC BLOOD PRESSURE: 120 MMHG | DIASTOLIC BLOOD PRESSURE: 78 MMHG | OXYGEN SATURATION: 97 % | BODY MASS INDEX: 24.84 KG/M2

## 2017-11-10 DIAGNOSIS — E78.2 MIXED HYPERLIPIDEMIA: ICD-10-CM

## 2017-11-10 DIAGNOSIS — I10 ESSENTIAL HYPERTENSION: Primary | ICD-10-CM

## 2017-11-10 DIAGNOSIS — R07.89 ATYPICAL CHEST PAIN: ICD-10-CM

## 2017-11-10 PROCEDURE — 99213 OFFICE O/P EST LOW 20 MIN: CPT | Performed by: INTERNAL MEDICINE

## 2017-11-10 RX ORDER — HYDRALAZINE HYDROCHLORIDE 10 MG/1
20 TABLET, FILM COATED ORAL 3 TIMES DAILY
Qty: 180 TABLET | Refills: 2 | Status: SHIPPED | OUTPATIENT
Start: 2017-11-10 | End: 2018-01-30 | Stop reason: SDUPTHER

## 2017-11-10 RX ORDER — METOPROLOL SUCCINATE 25 MG/1
25 TABLET, EXTENDED RELEASE ORAL DAILY
Qty: 90 TABLET | Refills: 0 | Status: SHIPPED | OUTPATIENT
Start: 2017-11-10 | End: 2018-10-15

## 2017-11-10 RX ORDER — VALSARTAN 80 MG/1
80 TABLET ORAL 2 TIMES DAILY
Qty: 180 TABLET | Refills: 0 | Status: SHIPPED | OUTPATIENT
Start: 2017-11-10 | End: 2018-10-05

## 2017-11-10 NOTE — PROGRESS NOTES
subjective     Chief Complaint   Patient presents with   • Follow-up   • Hypertension   • Hyperlipidemia     History of Present Illness    Patient is 77 years old white female who recently had cardiac workup.  About 3 months ago stress test was felt to be negative for significant exercise-induced myocardial ischemia with normal LV ejection fraction of 70%    Patient currently is doing very well.  She is following closely with the her PCP landen MASON  Lead pressure has been very well controlled.  She is taking Diovan and metoprolol and hydralazine  Patient wants medications called in for 90 day supply to [facility].  There are no drug side effects.  Blood pressure has been running normal.  With heart rate around 78    Past Surgical History:   Procedure Laterality Date   • BREAST LUMPECTOMY     • CARDIOVASCULAR STRESS TEST  02/10/2016   • ECHO - CONVERTED  02/10/2016   • HYSTERECTOMY     • SKIN CANCER EXCISION       Family History   Problem Relation Age of Onset   • Heart attack Mother    • Heart disease Mother    • Heart failure Mother    • Heart attack Father    • Heart disease Father    • Heart failure Father    • No Known Problems Sister    • Emphysema Brother      Past Medical History:   Diagnosis Date   • Cancer     skin   • GERD (gastroesophageal reflux disease)    • Hyperlipidemia    • Hypertension      Patient Active Problem List   Diagnosis   • Hypertension   • Hyperlipidemia   • GERD (gastroesophageal reflux disease)   • Arthritis   • Osteoporosis   • Left low back pain   • Atypical chest pain       Social History   Substance Use Topics   • Smoking status: Never Smoker   • Smokeless tobacco: Never Used   • Alcohol use No       Allergies   Allergen Reactions   • Codeine    • Sulfa Antibiotics        Current Outpatient Prescriptions on File Prior to Visit   Medication Sig   • ALPRAZolam (XANAX) 0.25 MG tablet Take 0.25 mg by mouth at night as needed for anxiety.   • aspirin 81 MG tablet Take 1 tablet by  "mouth Daily.   • atorvastatin (LIPITOR) 10 MG tablet Take 10 mg by mouth daily.   • Calcium-Magnesium-Vitamin D (CALCIUM 500 PO) Take  by mouth.   • cholecalciferol (VITAMIN D3) 1000 UNITS tablet Take 1,000 Units by mouth daily.   • Cyanocobalamin (B-12 IJ) Inject  as directed.   • HYDROcodone-acetaminophen (NORCO) 5-325 MG per tablet Take 1 tablet by mouth 2 (two) times a day as needed.   • ibandronate (BONIVA) 150 MG tablet Take 150 mg by mouth every 30 (thirty) days.   • meloxicam (MOBIC) 15 MG tablet Take 15 mg by mouth daily.   • ondansetron (ZOFRAN) 4 MG tablet Take 1 tablet by mouth Every 6 (Six) Hours. PRN Nausea/vomiting     No current facility-administered medications on file prior to visit.          The following portions of the patient's history were reviewed and updated as appropriate: allergies, current medications, past family history, past medical history, past social history, past surgical history and problem list.    Review of Systems   Constitution: Negative.   HENT: Negative.  Negative for congestion.    Eyes: Negative.    Cardiovascular: Negative.  Negative for chest pain, cyanosis, dyspnea on exertion, irregular heartbeat, leg swelling, near-syncope, orthopnea, palpitations, paroxysmal nocturnal dyspnea and syncope.   Respiratory: Negative.  Negative for shortness of breath.    Hematologic/Lymphatic: Negative.    Musculoskeletal: Negative.    Gastrointestinal: Negative.    Neurological: Negative.  Negative for headaches.          Objective:     /78 (BP Location: Left arm, Patient Position: Sitting)  Pulse 70  Ht 62\" (157.5 cm)  Wt 135 lb (61.2 kg)  SpO2 97%  BMI 24.69 kg/m2  Physical Exam   Constitutional: She appears well-developed and well-nourished. No distress.   HENT:   Head: Normocephalic and atraumatic.   Mouth/Throat: Oropharynx is clear and moist. No oropharyngeal exudate.   Eyes: Conjunctivae and EOM are normal. Pupils are equal, round, and reactive to light. No scleral " icterus.   Neck: Normal range of motion. Neck supple. No JVD present. No tracheal deviation present. No thyromegaly present.   Cardiovascular: Normal rate, regular rhythm, normal heart sounds and intact distal pulses.  PMI is not displaced.  Exam reveals no gallop, no friction rub and no decreased pulses.    No murmur heard.  Pulses:       Carotid pulses are 3+ on the right side, and 3+ on the left side.       Radial pulses are 3+ on the right side, and 3+ on the left side.   Pulmonary/Chest: Effort normal and breath sounds normal. No respiratory distress. She has no wheezes. She has no rales. She exhibits no tenderness.   Abdominal: Soft. Bowel sounds are normal. She exhibits no distension, no abdominal bruit and no mass. There is no splenomegaly or hepatomegaly. There is no tenderness. There is no rebound and no guarding.   Musculoskeletal: Normal range of motion. She exhibits no edema, tenderness or deformity.   Lymphadenopathy:     She has no cervical adenopathy.   Neurological: She is alert. She has normal reflexes. No cranial nerve deficit. She exhibits normal muscle tone. Coordination normal.   Skin: Skin is warm and dry. No rash noted. She is not diaphoretic. No erythema.   Psychiatric: She has a normal mood and affect. Her behavior is normal. Judgment and thought content normal.         Lab Review  Lab Results   Component Value Date     07/23/2017    K 4.5 07/23/2017     07/23/2017    BUN 14 07/23/2017    CREATININE 1.07 07/23/2017    GLUCOSE 112 (H) 07/23/2017    CALCIUM 9.7 07/23/2017    ALT 13 07/23/2017    ALKPHOS 56 07/23/2017    LABIL2 1.5 07/23/2017     Lab Results   Component Value Date    CKTOTAL 64 07/23/2017     Lab Results   Component Value Date    WBC 7.20 07/23/2017    HGB 12.1 07/23/2017    HCT 37.5 07/23/2017     07/23/2017     No results found for: INR  No results found for: MG  No results found for: PSA, TSH  Lab Results   Component Value Date    BNP 66.0 07/23/2017      No results found for: CHLPL, CHOL, TRIG, HDL, LDLCALC, VLDL, LDLHDL      Procedures       I personally viewed and interpreted the patient's LAB data         Assessment:     1. Essential hypertension    2. Atypical chest pain    3. Mixed hyperlipidemia          Plan:      Patient sees be doing very well.  Blood pressure is very well controlled.  Refills of medications were given.  Cardiac workup discussed with the patient results of stress test explained.  I feel the patient has no significant coronary artery disease at this time.  Patient does have multiple cardiac risk factors.  Aggressive risk factor modification emphasized.  Healthy lifestyle discussed.  Follow-up in 6 months scheduled.        No Follow-up on file.

## 2017-11-12 PROBLEM — R07.89 ATYPICAL CHEST PAIN: Status: ACTIVE | Noted: 2017-11-12

## 2018-01-31 RX ORDER — HYDRALAZINE HYDROCHLORIDE 10 MG/1
TABLET, FILM COATED ORAL
Qty: 540 TABLET | Refills: 2 | Status: SHIPPED | OUTPATIENT
Start: 2018-01-31 | End: 2018-05-25 | Stop reason: SDUPTHER

## 2018-02-09 ENCOUNTER — OFFICE VISIT (OUTPATIENT)
Dept: CARDIOLOGY | Facility: CLINIC | Age: 78
End: 2018-02-09

## 2018-02-09 VITALS
DIASTOLIC BLOOD PRESSURE: 70 MMHG | HEART RATE: 71 BPM | HEIGHT: 62 IN | BODY MASS INDEX: 24.29 KG/M2 | WEIGHT: 132 LBS | OXYGEN SATURATION: 96 % | RESPIRATION RATE: 16 BRPM | SYSTOLIC BLOOD PRESSURE: 118 MMHG

## 2018-02-09 DIAGNOSIS — I10 ESSENTIAL HYPERTENSION: Primary | ICD-10-CM

## 2018-02-09 DIAGNOSIS — E78.2 MIXED HYPERLIPIDEMIA: ICD-10-CM

## 2018-02-09 PROCEDURE — 99213 OFFICE O/P EST LOW 20 MIN: CPT | Performed by: INTERNAL MEDICINE

## 2018-02-09 NOTE — PROGRESS NOTES
subjective     Chief Complaint   Patient presents with   • Hypertension   • Hyperlipidemia   • Heartburn     History of Present Illness    Patient is 77 years old white female who has history of hypertension and hyperlipidemia.  She is here for follow-up.  Patient is currently taking hydralazine, Toprol-XL and Diovan.  Blood pressure is very well controlled.  It's around 120/70.  Heart rate is around 70/m.  Patient denies any chest pain palpitations or shortness of breath.    She also has hyperlipidemia and is taking Lipitor.  She is following very closely with the her PCP landen daily.  Patient has no new complaints today.  Past Surgical History:   Procedure Laterality Date   • BREAST LUMPECTOMY     • CARDIOVASCULAR STRESS TEST  02/10/2016   • ECHO - CONVERTED  02/10/2016   • HYSTERECTOMY     • SKIN CANCER EXCISION       Family History   Problem Relation Age of Onset   • Heart attack Mother    • Heart disease Mother    • Heart failure Mother    • Heart attack Father    • Heart disease Father    • Heart failure Father    • No Known Problems Sister    • Emphysema Brother      Past Medical History:   Diagnosis Date   • Cancer     skin   • GERD (gastroesophageal reflux disease)    • Hyperlipidemia    • Hypertension      Patient Active Problem List   Diagnosis   • Hypertension   • Hyperlipidemia   • GERD (gastroesophageal reflux disease)   • Arthritis   • Osteoporosis   • Left low back pain   • Atypical chest pain       Social History   Substance Use Topics   • Smoking status: Never Smoker   • Smokeless tobacco: Never Used   • Alcohol use No       Allergies   Allergen Reactions   • Codeine    • Sulfa Antibiotics        Current Outpatient Prescriptions on File Prior to Visit   Medication Sig   • ALPRAZolam (XANAX) 0.25 MG tablet Take 0.25 mg by mouth at night as needed for anxiety.   • aspirin 81 MG tablet Take 1 tablet by mouth Daily.   • atorvastatin (LIPITOR) 10 MG tablet Take 10 mg by mouth daily.   •  "Calcium-Magnesium-Vitamin D (CALCIUM 500 PO) Take  by mouth.   • cholecalciferol (VITAMIN D3) 1000 UNITS tablet Take 1,000 Units by mouth daily.   • Cyanocobalamin (B-12 IJ) Inject  as directed.   • hydrALAZINE (APRESOLINE) 10 MG tablet TAKE 2 TABLETS THREE TIMES DAILY   • HYDROcodone-acetaminophen (NORCO) 5-325 MG per tablet Take 1 tablet by mouth 2 (two) times a day as needed.   • ibandronate (BONIVA) 150 MG tablet Take 150 mg by mouth every 30 (thirty) days.   • meloxicam (MOBIC) 15 MG tablet Take 15 mg by mouth daily.   • metoprolol succinate XL (TOPROL-XL) 25 MG 24 hr tablet Take 1 tablet by mouth Daily.   • ondansetron (ZOFRAN) 4 MG tablet Take 1 tablet by mouth Every 6 (Six) Hours. PRN Nausea/vomiting   • valsartan (DIOVAN) 80 MG tablet Take 1 tablet by mouth 2 (Two) Times a Day.     No current facility-administered medications on file prior to visit.          The following portions of the patient's history were reviewed and updated as appropriate: allergies, current medications, past family history, past medical history, past social history, past surgical history and problem list.    Review of Systems   Constitution: Negative.   HENT: Negative.  Negative for congestion.    Eyes: Negative.    Cardiovascular: Negative.  Negative for chest pain, cyanosis, dyspnea on exertion, irregular heartbeat, leg swelling, near-syncope, orthopnea, palpitations, paroxysmal nocturnal dyspnea and syncope.   Respiratory: Negative.  Negative for shortness of breath.    Hematologic/Lymphatic: Negative.    Musculoskeletal: Negative.    Gastrointestinal: Negative.    Neurological: Negative.  Negative for headaches.          Objective:     /70 (BP Location: Left arm, Patient Position: Sitting, Cuff Size: Adult)  Pulse 71  Resp 16  Ht 157.5 cm (62\")  Wt 59.9 kg (132 lb)  SpO2 96%  BMI 24.14 kg/m2  Physical Exam   Constitutional: She appears well-developed and well-nourished. No distress.   HENT:   Head: Normocephalic and " atraumatic.   Mouth/Throat: Oropharynx is clear and moist. No oropharyngeal exudate.   Eyes: Conjunctivae and EOM are normal. Pupils are equal, round, and reactive to light. No scleral icterus.   Neck: Normal range of motion. Neck supple. No JVD present. No tracheal deviation present. No thyromegaly present.   Cardiovascular: Normal rate, regular rhythm, normal heart sounds and intact distal pulses.  PMI is not displaced.  Exam reveals no gallop, no friction rub and no decreased pulses.    No murmur heard.  Pulses:       Carotid pulses are 3+ on the right side, and 3+ on the left side.       Radial pulses are 3+ on the right side, and 3+ on the left side.   Pulmonary/Chest: Effort normal and breath sounds normal. No respiratory distress. She has no wheezes. She has no rales. She exhibits no tenderness.   Abdominal: Soft. Bowel sounds are normal. She exhibits no distension, no abdominal bruit and no mass. There is no splenomegaly or hepatomegaly. There is no tenderness. There is no rebound and no guarding.   Musculoskeletal: Normal range of motion. She exhibits no edema, tenderness or deformity.   Lymphadenopathy:     She has no cervical adenopathy.   Neurological: She is alert. She has normal reflexes. No cranial nerve deficit. She exhibits normal muscle tone. Coordination normal.   Skin: Skin is warm and dry. No rash noted. She is not diaphoretic. No erythema.   Psychiatric: She has a normal mood and affect. Her behavior is normal. Judgment and thought content normal.         Lab Review  Lab Results   Component Value Date     07/23/2017    K 4.5 07/23/2017     07/23/2017    BUN 14 07/23/2017    CREATININE 1.07 07/23/2017    GLUCOSE 112 (H) 07/23/2017    CALCIUM 9.7 07/23/2017    ALT 13 07/23/2017    ALKPHOS 56 07/23/2017    LABIL2 1.5 07/23/2017     Lab Results   Component Value Date    CKTOTAL 64 07/23/2017     Lab Results   Component Value Date    WBC 7.20 07/23/2017    HGB 12.1 07/23/2017    HCT 37.5  07/23/2017     07/23/2017     No results found for: INR  No results found for: MG  No results found for: PSA, TSH  Lab Results   Component Value Date    BNP 66.0 07/23/2017     No results found for: CHLPL, CHOL, TRIG, HDL, LDLCALC, VLDL, LDLHDL      Procedures       I personally viewed and interpreted the patient's LAB data         Assessment:     1. Essential hypertension    2. Mixed hyperlipidemia          Plan:      patient is planning to have lab work done next week and then she will see her PCP… Dacia daily.  Blood pressure is very well controlled.  She was advised to continue Diovan, hydralazine and Toprol XL.  Lipids are being controlled by dacia daily.  Will get a copy of labs for our records.  Follow-up scheduled.  Refills were given            No Follow-up on file.

## 2018-03-10 ENCOUNTER — HOSPITAL ENCOUNTER (EMERGENCY)
Facility: HOSPITAL | Age: 78
Discharge: HOME | End: 2018-03-10
Attending: EMERGENCY MEDICINE | Admitting: EMERGENCY MEDICINE

## 2018-03-10 VITALS
DIASTOLIC BLOOD PRESSURE: 67 MMHG | HEART RATE: 65 BPM | WEIGHT: 132 LBS | HEIGHT: 65 IN | RESPIRATION RATE: 18 BRPM | BODY MASS INDEX: 21.99 KG/M2 | OXYGEN SATURATION: 97 % | SYSTOLIC BLOOD PRESSURE: 132 MMHG | TEMPERATURE: 97.3 F

## 2018-03-10 DIAGNOSIS — T78.40XA ALLERGIC REACTION, INITIAL ENCOUNTER: Primary | ICD-10-CM

## 2018-03-10 PROCEDURE — 99283 EMERGENCY DEPT VISIT LOW MDM: CPT

## 2018-03-10 PROCEDURE — 25010000002 DIPHENHYDRAMINE PER 50 MG: Performed by: PHYSICIAN ASSISTANT

## 2018-03-10 PROCEDURE — 25010000002 METHYLPREDNISOLONE PER 125 MG: Performed by: PHYSICIAN ASSISTANT

## 2018-03-10 PROCEDURE — 96372 THER/PROPH/DIAG INJ SC/IM: CPT

## 2018-03-10 RX ORDER — METHYLPREDNISOLONE 4 MG/1
TABLET ORAL
Qty: 21 TABLET | Refills: 0 | Status: SHIPPED | OUTPATIENT
Start: 2018-03-10 | End: 2018-05-25 | Stop reason: ALTCHOICE

## 2018-03-10 RX ORDER — DIPHENHYDRAMINE HYDROCHLORIDE 50 MG/ML
50 INJECTION INTRAMUSCULAR; INTRAVENOUS ONCE
Status: COMPLETED | OUTPATIENT
Start: 2018-03-10 | End: 2018-03-10

## 2018-03-10 RX ORDER — PERMETHRIN 50 MG/G
CREAM TOPICAL ONCE
COMMUNITY

## 2018-03-10 RX ORDER — METHYLPREDNISOLONE SODIUM SUCCINATE 125 MG/2ML
125 INJECTION, POWDER, LYOPHILIZED, FOR SOLUTION INTRAMUSCULAR; INTRAVENOUS ONCE
Status: COMPLETED | OUTPATIENT
Start: 2018-03-10 | End: 2018-03-10

## 2018-03-10 RX ORDER — FAMOTIDINE 20 MG/1
20 TABLET, FILM COATED ORAL ONCE
Status: COMPLETED | OUTPATIENT
Start: 2018-03-10 | End: 2018-03-10

## 2018-03-10 RX ORDER — FAMOTIDINE 20 MG/1
20 TABLET, FILM COATED ORAL 2 TIMES DAILY
Qty: 30 TABLET | Refills: 0 | Status: SHIPPED | OUTPATIENT
Start: 2018-03-10 | End: 2018-10-05

## 2018-03-10 RX ORDER — TRAMADOL HYDROCHLORIDE 50 MG/1
50 TABLET ORAL EVERY 6 HOURS PRN
COMMUNITY

## 2018-03-10 RX ORDER — CETIRIZINE HYDROCHLORIDE 10 MG/1
10 TABLET ORAL DAILY
COMMUNITY

## 2018-03-10 RX ADMIN — METHYLPREDNISOLONE SODIUM SUCCINATE 125 MG: 125 INJECTION, POWDER, FOR SOLUTION INTRAMUSCULAR; INTRAVENOUS at 14:55

## 2018-03-10 RX ADMIN — FAMOTIDINE 20 MG: 20 TABLET, FILM COATED ORAL at 14:54

## 2018-03-10 RX ADMIN — DIPHENHYDRAMINE HYDROCHLORIDE 50 MG: 50 INJECTION INTRAMUSCULAR; INTRAVENOUS at 14:55

## 2018-03-10 NOTE — ED PROVIDER NOTES
Subjective     History provided by:  Patient and parent   used: No    Rash   Location:  Full body  Quality: dryness, itchiness and redness    Severity:  Moderate  Onset quality:  Sudden  Duration:  2 weeks  Timing:  Constant  Progression:  Unchanged  Chronicity:  New  Context: medications    Context comment:  Pt took 3 doses of a beta blocker x2.5 weeks ago, and 3 days later, rash began  Relieved by:  Nothing  Worsened by:  Nothing  Ineffective treatments:  Antihistamines (permethrin cream, IM steroids)  Associated symptoms: no diarrhea, no fever, no headaches, no joint pain, no myalgias, no nausea, no periorbital edema, no shortness of breath, no sore throat, no throat swelling, no tongue swelling, not vomiting and not wheezing        Review of Systems   Constitutional: Negative for activity change and fever.   HENT: Negative for congestion and sore throat.    Eyes: Negative for pain.   Respiratory: Negative for cough, shortness of breath and wheezing.    Cardiovascular: Negative for chest pain.   Gastrointestinal: Negative for abdominal distention, diarrhea, nausea and vomiting.   Musculoskeletal: Negative for arthralgias and myalgias.   Skin: Positive for rash. Negative for wound.   Neurological: Negative for dizziness and headaches.   Psychiatric/Behavioral: Negative for agitation.   All other systems reviewed and are negative.      Past Medical History:   Diagnosis Date   • Cancer     skin   • GERD (gastroesophageal reflux disease)    • Hyperlipidemia    • Hypertension        Allergies   Allergen Reactions   • Codeine    • Sulfa Antibiotics        Past Surgical History:   Procedure Laterality Date   • BREAST LUMPECTOMY     • CARDIOVASCULAR STRESS TEST  02/10/2016   • ECHO - CONVERTED  02/10/2016   • HYSTERECTOMY     • SKIN CANCER EXCISION         Family History   Problem Relation Age of Onset   • Heart attack Mother    • Heart disease Mother    • Heart failure Mother    • Heart attack Father     • Heart disease Father    • Heart failure Father    • No Known Problems Sister    • Emphysema Brother        Social History     Social History   • Marital status:      Social History Main Topics   • Smoking status: Never Smoker   • Smokeless tobacco: Never Used   • Alcohol use No   • Drug use: No     Other Topics Concern   • Not on file           Objective   Physical Exam   Constitutional: She is oriented to person, place, and time. She appears well-developed and well-nourished.   HENT:   Head: Normocephalic and atraumatic.   Eyes: EOM are normal. Pupils are equal, round, and reactive to light.   Neck: Normal range of motion. Neck supple.   Cardiovascular: Normal rate, regular rhythm and normal heart sounds.    Pulmonary/Chest: Effort normal and breath sounds normal.   Abdominal: Soft. Bowel sounds are normal.   Musculoskeletal: Normal range of motion.   Neurological: She is alert and oriented to person, place, and time.   Skin: Skin is warm and dry. Rash noted. Rash is urticarial.   Psychiatric: She has a normal mood and affect. Her behavior is normal. Judgment and thought content normal.   Nursing note and vitals reviewed.      Procedures         ED Course  ED Course                  MDM  Number of Diagnoses or Management Options     Amount and/or Complexity of Data Reviewed  Clinical lab tests: ordered and reviewed  Tests in the radiology section of CPT®: ordered and reviewed  Tests in the medicine section of CPT®: reviewed and ordered    Patient Progress  Patient progress: stable      Final diagnoses:   Allergic reaction, initial encounter            RAFAEL Richard  03/10/18 [id number]

## 2018-05-25 ENCOUNTER — OFFICE VISIT (OUTPATIENT)
Dept: CARDIOLOGY | Facility: CLINIC | Age: 78
End: 2018-05-25

## 2018-05-25 VITALS
SYSTOLIC BLOOD PRESSURE: 102 MMHG | HEART RATE: 59 BPM | HEIGHT: 65 IN | OXYGEN SATURATION: 96 % | WEIGHT: 133 LBS | DIASTOLIC BLOOD PRESSURE: 64 MMHG | BODY MASS INDEX: 22.16 KG/M2

## 2018-05-25 DIAGNOSIS — R00.1 BRADYCARDIA: ICD-10-CM

## 2018-05-25 DIAGNOSIS — I10 ESSENTIAL HYPERTENSION: ICD-10-CM

## 2018-05-25 DIAGNOSIS — E78.2 MIXED HYPERLIPIDEMIA: Primary | ICD-10-CM

## 2018-05-25 PROCEDURE — 99213 OFFICE O/P EST LOW 20 MIN: CPT | Performed by: INTERNAL MEDICINE

## 2018-05-25 RX ORDER — HYDRALAZINE HYDROCHLORIDE 10 MG/1
10 TABLET, FILM COATED ORAL 2 TIMES DAILY
Qty: 60 TABLET | Refills: 2 | OUTPATIENT
Start: 2018-05-25 | End: 2018-10-15 | Stop reason: SDUPTHER

## 2018-05-25 RX ORDER — HYDRALAZINE HYDROCHLORIDE 10 MG/1
10 TABLET, FILM COATED ORAL 3 TIMES DAILY
Qty: 90 TABLET | Refills: 2 | OUTPATIENT
Start: 2018-05-25 | End: 2018-05-25 | Stop reason: SDUPTHER

## 2018-05-25 NOTE — PROGRESS NOTES
subjective     Chief Complaint   Patient presents with   • Hypertension   • Hyperlipidemia   • Follow-up     History of Present Illness  Patient is 77 years old white female who is here for cardiology follow-up.  Patient has history of hypertension.  She is taking hydralazine 20 mg 3 times a day, Toprol-XL 25 daily and Diovan 80 twice a day.  Her blood pressure is running quite low.  It started on 102/64.  Patient complains of being weak but no dizziness.  There is no syncope or presyncope.    Heart rate also runs low but it is around 59.  Patient denies any chest pain.    She has hyperlipidemia and is taking Lipitor she had lab work done recently will obtain copy for our records.    Past Surgical History:   Procedure Laterality Date   • BREAST LUMPECTOMY     • CARDIOVASCULAR STRESS TEST  02/10/2016   • ECHO - CONVERTED  02/10/2016   • HYSTERECTOMY     • SKIN CANCER EXCISION       Family History   Problem Relation Age of Onset   • Heart attack Mother    • Heart disease Mother    • Heart failure Mother    • Heart attack Father    • Heart disease Father    • Heart failure Father    • No Known Problems Sister    • Emphysema Brother      Past Medical History:   Diagnosis Date   • Cancer     skin   • GERD (gastroesophageal reflux disease)    • Hyperlipidemia    • Hypertension      Patient Active Problem List   Diagnosis   • Hypertension   • Hyperlipidemia   • GERD (gastroesophageal reflux disease)   • Arthritis   • Osteoporosis   • Left low back pain   • Atypical chest pain   • Bradycardia       Social History   Substance Use Topics   • Smoking status: Never Smoker   • Smokeless tobacco: Never Used   • Alcohol use No       Allergies   Allergen Reactions   • Codeine    • Sulfa Antibiotics    • Green Dyes Rash   • Red Dye Rash       Current Outpatient Prescriptions on File Prior to Visit   Medication Sig   • ALPRAZolam (XANAX) 0.25 MG tablet Take 0.25 mg by mouth at night as needed for anxiety.   • aspirin 81 MG tablet  Take 1 tablet by mouth Daily.   • atorvastatin (LIPITOR) 10 MG tablet Take 10 mg by mouth daily.   • Calcium-Magnesium-Vitamin D (CALCIUM 500 PO) Take  by mouth.   • cetirizine (zyrTEC) 10 MG tablet Take 10 mg by mouth Daily.   • cholecalciferol (VITAMIN D3) 1000 UNITS tablet Take 1,000 Units by mouth daily.   • Cyanocobalamin (B-12 IJ) Inject  as directed.   • famotidine (PEPCID) 20 MG tablet Take 1 tablet by mouth 2 (Two) Times a Day.   • HYDROcodone-acetaminophen (NORCO) 5-325 MG per tablet Take 1 tablet by mouth 2 (two) times a day as needed.   • ibandronate (BONIVA) 150 MG tablet Take 150 mg by mouth every 30 (thirty) days.   • meloxicam (MOBIC) 15 MG tablet Take 15 mg by mouth daily.   • metoprolol succinate XL (TOPROL-XL) 25 MG 24 hr tablet Take 1 tablet by mouth Daily.   • ondansetron (ZOFRAN) 4 MG tablet Take 1 tablet by mouth Every 6 (Six) Hours. PRN Nausea/vomiting   • permethrin (ELIMITE) 5 % cream Apply  topically 1 (One) Time.   • traMADol (ULTRAM) 50 MG tablet Take 50 mg by mouth Every 6 (Six) Hours As Needed for Moderate Pain .   • valsartan (DIOVAN) 80 MG tablet Take 1 tablet by mouth 2 (Two) Times a Day.   • [DISCONTINUED] hydrALAZINE (APRESOLINE) 10 MG tablet TAKE 2 TABLETS THREE TIMES DAILY   • [DISCONTINUED] MethylPREDNISolone (MEDROL, DEDRICK,) 4 MG tablet Take as directed on package instructions.     No current facility-administered medications on file prior to visit.          The following portions of the patient's history were reviewed and updated as appropriate: allergies, current medications, past family history, past medical history, past social history, past surgical history and problem list.    Review of Systems   Constitution: Positive for malaise/fatigue.   HENT: Negative.  Negative for congestion.    Eyes: Negative.    Cardiovascular: Negative.  Negative for chest pain, cyanosis, dyspnea on exertion, irregular heartbeat, leg swelling, near-syncope, orthopnea, palpitations, paroxysmal  "nocturnal dyspnea and syncope.   Respiratory: Negative.  Negative for shortness of breath.    Hematologic/Lymphatic: Negative.    Musculoskeletal: Negative.    Gastrointestinal: Negative.    Neurological: Negative.  Negative for headaches.          Objective:     /64 (BP Location: Left arm, Patient Position: Sitting)   Pulse 59   Ht 165.1 cm (65\")   Wt 60.3 kg (133 lb)   SpO2 96%   BMI 22.13 kg/m²   Physical Exam   Constitutional: She appears well-developed and well-nourished. No distress.   HENT:   Head: Normocephalic and atraumatic.   Mouth/Throat: Oropharynx is clear and moist. No oropharyngeal exudate.   Eyes: Conjunctivae and EOM are normal. Pupils are equal, round, and reactive to light. No scleral icterus.   Neck: Normal range of motion. Neck supple. No JVD present. No tracheal deviation present. No thyromegaly present.   Cardiovascular: Normal rate, regular rhythm, normal heart sounds and intact distal pulses.  PMI is not displaced.  Exam reveals no gallop, no friction rub and no decreased pulses.    No murmur heard.  Pulses:       Carotid pulses are 3+ on the right side, and 3+ on the left side.       Radial pulses are 3+ on the right side, and 3+ on the left side.   Pulmonary/Chest: Effort normal and breath sounds normal. No respiratory distress. She has no wheezes. She has no rales. She exhibits no tenderness.   Abdominal: Soft. Bowel sounds are normal. She exhibits no distension, no abdominal bruit and no mass. There is no splenomegaly or hepatomegaly. There is no tenderness. There is no rebound and no guarding.   Musculoskeletal: Normal range of motion. She exhibits no edema, tenderness or deformity.   Lymphadenopathy:     She has no cervical adenopathy.   Neurological: She is alert. She has normal reflexes. No cranial nerve deficit. She exhibits normal muscle tone. Coordination normal.   Skin: Skin is warm and dry. No rash noted. She is not diaphoretic. No erythema.   Psychiatric: She has a " normal mood and affect. Her behavior is normal. Judgment and thought content normal.         Lab Review  Lab Results   Component Value Date     07/23/2017    K 4.5 07/23/2017     07/23/2017    BUN 14 07/23/2017    CREATININE 1.07 07/23/2017    GLUCOSE 112 (H) 07/23/2017    CALCIUM 9.7 07/23/2017    ALT 13 07/23/2017    ALKPHOS 56 07/23/2017    LABIL2 1.5 07/23/2017     Lab Results   Component Value Date    CKTOTAL 64 07/23/2017     Lab Results   Component Value Date    WBC 7.20 07/23/2017    HGB 12.1 07/23/2017    HCT 37.5 07/23/2017     07/23/2017     No results found for: INR  No results found for: MG  No results found for: PSA, TSH  Lab Results   Component Value Date    BNP 66.0 07/23/2017     No results found for: CHLPL, CHOL, TRIG, HDL, VLDL, LDLHDL  No results found for: LDL    Procedures       I personally viewed and interpreted the patient's LAB data         Assessment:     1. Mixed hyperlipidemia    2. Essential hypertension    3. Bradycardia          Plan:      Blood pressure is quite low.  Patient was advised to decrease hydralazine 10 mg twice a day.  She will continue Diovan and Toprol.  Patient will check her blood pressure closely and may need to go up on hydralazine to 10 mg 3 times a day.    She is taking Toprol-XL 25 daily.  Heart rate is slightly low at 59 but patient is asymptomatic we will continue Toprol at current dose.    She is also taking Lipitor from PCP will obtain and keep a copy of lab work  for our records.        Return in about 3 months (around 8/25/2018).

## 2018-10-05 ENCOUNTER — OFFICE VISIT (OUTPATIENT)
Dept: CARDIOLOGY | Facility: CLINIC | Age: 78
End: 2018-10-05

## 2018-10-05 VITALS
WEIGHT: 133 LBS | HEART RATE: 68 BPM | DIASTOLIC BLOOD PRESSURE: 78 MMHG | BODY MASS INDEX: 24.48 KG/M2 | HEIGHT: 62 IN | OXYGEN SATURATION: 98 % | SYSTOLIC BLOOD PRESSURE: 120 MMHG

## 2018-10-05 DIAGNOSIS — R00.1 BRADYCARDIA: ICD-10-CM

## 2018-10-05 DIAGNOSIS — I10 ESSENTIAL HYPERTENSION: Primary | ICD-10-CM

## 2018-10-05 PROCEDURE — 99213 OFFICE O/P EST LOW 20 MIN: CPT | Performed by: INTERNAL MEDICINE

## 2018-10-05 RX ORDER — LOSARTAN POTASSIUM 50 MG/1
50 TABLET ORAL DAILY
Qty: 90 TABLET | Refills: 1 | Status: SHIPPED | OUTPATIENT
Start: 2018-10-05 | End: 2018-10-12 | Stop reason: SDUPTHER

## 2018-10-05 NOTE — PROGRESS NOTES
subjective     Chief Complaint   Patient presents with   • Essential hypertension     Follow up   • Mixed hyperlipidemia     Follow up     History of Present Illness  Patient is 78 years old white female who is here for cardiology follow-up.  Her blood pressure has been difficult to control.  Patient claims to be allergic to amlodipine.  She also is not taking diuretic therapy because of mildly elevated creatinine.  She is taking Diovan and hydralazine.  According to her blood pressure has been normal.  She had lab work done recently and is planning to have another one done in the very near future she takes that lab work was apparently normal with normal lipids and normal renal functions.  She does not have any specific complaints today.    Past Surgical History:   Procedure Laterality Date   • BREAST LUMPECTOMY     • CARDIOVASCULAR STRESS TEST  02/10/2016   • ECHO - CONVERTED  02/10/2016   • HYSTERECTOMY     • SKIN CANCER EXCISION       Family History   Problem Relation Age of Onset   • Heart attack Mother    • Heart disease Mother    • Heart failure Mother    • Heart attack Father    • Heart disease Father    • Heart failure Father    • No Known Problems Sister    • Emphysema Brother      Past Medical History:   Diagnosis Date   • Cancer (CMS/HCC)     skin   • GERD (gastroesophageal reflux disease)    • Hyperlipidemia    • Hypertension      Patient Active Problem List   Diagnosis   • Hypertension   • Hyperlipidemia   • GERD (gastroesophageal reflux disease)   • Arthritis   • Osteoporosis   • Left low back pain   • Atypical chest pain   • Bradycardia       Social History   Substance Use Topics   • Smoking status: Never Smoker   • Smokeless tobacco: Never Used   • Alcohol use No       Allergies   Allergen Reactions   • Activated Charcoal Itching   • Codeine    • Sulfa Antibiotics    • Green Dyes Rash   • Penicillins Rash   • Red Dye Rash       Current Outpatient Prescriptions on File Prior to Visit   Medication Sig    • ALPRAZolam (XANAX) 0.25 MG tablet Take 0.25 mg by mouth at night as needed for anxiety.   • aspirin 81 MG tablet Take 1 tablet by mouth Daily.   • atorvastatin (LIPITOR) 10 MG tablet Take 10 mg by mouth daily.   • Calcium-Magnesium-Vitamin D (CALCIUM 500 PO) Take  by mouth.   • cetirizine (zyrTEC) 10 MG tablet Take 10 mg by mouth Daily.   • cholecalciferol (VITAMIN D3) 1000 UNITS tablet Take 1,000 Units by mouth daily.   • Cyanocobalamin (B-12 IJ) Inject  as directed.   • hydrALAZINE (APRESOLINE) 10 MG tablet Take 1 tablet by mouth 2 (Two) Times a Day.   • HYDROcodone-acetaminophen (NORCO) 5-325 MG per tablet Take 1 tablet by mouth 2 (two) times a day as needed.   • ibandronate (BONIVA) 150 MG tablet Take 150 mg by mouth every 30 (thirty) days.   • metoprolol succinate XL (TOPROL-XL) 25 MG 24 hr tablet Take 1 tablet by mouth Daily.   • ondansetron (ZOFRAN) 4 MG tablet Take 1 tablet by mouth Every 6 (Six) Hours. PRN Nausea/vomiting   • permethrin (ELIMITE) 5 % cream Apply  topically 1 (One) Time.   • traMADol (ULTRAM) 50 MG tablet Take 50 mg by mouth Every 6 (Six) Hours As Needed for Moderate Pain .   • [DISCONTINUED] famotidine (PEPCID) 20 MG tablet Take 1 tablet by mouth 2 (Two) Times a Day.   • [DISCONTINUED] meloxicam (MOBIC) 15 MG tablet Take 15 mg by mouth daily.   • [DISCONTINUED] valsartan (DIOVAN) 80 MG tablet Take 1 tablet by mouth 2 (Two) Times a Day.     No current facility-administered medications on file prior to visit.          The following portions of the patient's history were reviewed and updated as appropriate: allergies, current medications, past family history, past medical history, past social history, past surgical history and problem list.    Review of Systems   Constitution: Negative.   HENT: Negative.  Negative for congestion.    Eyes: Negative.    Cardiovascular: Negative.  Negative for chest pain, cyanosis, dyspnea on exertion, irregular heartbeat, leg swelling, near-syncope,  "orthopnea, palpitations, paroxysmal nocturnal dyspnea and syncope.   Respiratory: Negative.  Negative for shortness of breath.    Hematologic/Lymphatic: Negative.    Musculoskeletal: Negative.    Gastrointestinal: Negative.    Neurological: Negative.  Negative for headaches.          Objective:     /78   Pulse 68   Ht 157.5 cm (62\")   Wt 60.3 kg (133 lb)   SpO2 98%   BMI 24.33 kg/m²   Physical Exam   Constitutional: She appears well-developed and well-nourished. No distress.   HENT:   Head: Normocephalic and atraumatic.   Mouth/Throat: Oropharynx is clear and moist. No oropharyngeal exudate.   Eyes: Pupils are equal, round, and reactive to light. Conjunctivae and EOM are normal. No scleral icterus.   Neck: Normal range of motion. Neck supple. No JVD present. No tracheal deviation present. No thyromegaly present.   Cardiovascular: Normal rate, regular rhythm, normal heart sounds and intact distal pulses.  PMI is not displaced.  Exam reveals no gallop, no friction rub and no decreased pulses.    No murmur heard.  Pulses:       Carotid pulses are 3+ on the right side, and 3+ on the left side.       Radial pulses are 3+ on the right side, and 3+ on the left side.   Pulmonary/Chest: Effort normal and breath sounds normal. No respiratory distress. She has no wheezes. She has no rales. She exhibits no tenderness.   Abdominal: Soft. Bowel sounds are normal. She exhibits no distension, no abdominal bruit and no mass. There is no splenomegaly or hepatomegaly. There is no tenderness. There is no rebound and no guarding.   Musculoskeletal: Normal range of motion. She exhibits no edema, tenderness or deformity.   Lymphadenopathy:     She has no cervical adenopathy.   Neurological: She is alert. She has normal reflexes. No cranial nerve deficit. She exhibits normal muscle tone. Coordination normal.   Skin: Skin is warm and dry. No rash noted. She is not diaphoretic. No erythema.   Psychiatric: She has a normal mood and " affect. Her behavior is normal. Judgment and thought content normal.         Lab Review    Procedures               Assessment:     1. Essential hypertension    2. Bradycardia          Plan:      Blood pressure is very well controlled.    Patient was advised to switch Diovan to losartan 50 mg daily.   She'll check her blood pressure daily and keep a record if it is high she will call us.  Bradycardia has resolved.  She is able to tolerate Toprol-XL 25 daily.  Lipids apparently have been normal and is being managed by her PCP.  Renal functions will need be monitored closely.          Return in about 3 months (around 1/5/2019).

## 2018-10-12 ENCOUNTER — TELEPHONE (OUTPATIENT)
Dept: CARDIOLOGY | Facility: CLINIC | Age: 78
End: 2018-10-12

## 2018-10-12 RX ORDER — LOSARTAN POTASSIUM 50 MG/1
50 TABLET ORAL 2 TIMES DAILY
Qty: 90 TABLET | Refills: 3 | Status: SHIPPED | OUTPATIENT
Start: 2018-10-12 | End: 2018-10-15

## 2018-10-15 ENCOUNTER — OFFICE VISIT (OUTPATIENT)
Dept: CARDIOLOGY | Facility: CLINIC | Age: 78
End: 2018-10-15

## 2018-10-15 VITALS
OXYGEN SATURATION: 100 % | BODY MASS INDEX: 24.66 KG/M2 | HEART RATE: 79 BPM | SYSTOLIC BLOOD PRESSURE: 198 MMHG | HEIGHT: 62 IN | WEIGHT: 134 LBS | DIASTOLIC BLOOD PRESSURE: 84 MMHG

## 2018-10-15 DIAGNOSIS — I10 ESSENTIAL HYPERTENSION: Primary | ICD-10-CM

## 2018-10-15 DIAGNOSIS — E78.2 MIXED HYPERLIPIDEMIA: ICD-10-CM

## 2018-10-15 DIAGNOSIS — N18.2 STAGE 2 CHRONIC KIDNEY DISEASE: ICD-10-CM

## 2018-10-15 PROCEDURE — 99213 OFFICE O/P EST LOW 20 MIN: CPT | Performed by: INTERNAL MEDICINE

## 2018-10-15 RX ORDER — HYDRALAZINE HYDROCHLORIDE 25 MG/1
25 TABLET, FILM COATED ORAL 4 TIMES DAILY
Qty: 90 TABLET | Refills: 0 | OUTPATIENT
Start: 2018-10-15 | End: 2019-07-12

## 2018-10-15 RX ORDER — METOPROLOL SUCCINATE 25 MG/1
50 TABLET, EXTENDED RELEASE ORAL DAILY
Qty: 90 TABLET | Refills: 0 | Status: SHIPPED | OUTPATIENT
Start: 2018-10-15 | End: 2019-07-12

## 2018-10-15 RX ORDER — VALSARTAN 80 MG/1
80 TABLET ORAL 2 TIMES DAILY
Qty: 60 TABLET | Refills: 0 | Status: SHIPPED | OUTPATIENT
Start: 2018-10-15 | End: 2019-07-12

## 2018-10-15 NOTE — PROGRESS NOTES
subjective     Chief Complaint   Patient presents with   • Hypertension     History of Present Illness  Patient is 78 years old white female who has history of 4 hypertension.  Patient states that she is allergic to losartan because she has been having some stuffy nose and watery eyes and dizziness and jitteriness.  Blood pressure is not controlled actually is running quite high.  Today blood pressure is around 198 systolic.    Patient was taking Diovan until the advisory about Diovan came.  Her blood pressure at that time according to her was perfectly normal.  She was taking Diovan only 80 mg twice a day.  Now she is taking losartan 100 and it is not helping.  Patient wants to go back on Diovan.  She states that she has 3 month supply at home.  She even called the pharmacy at [facility] and was told that Diovan that she has is okay to be taken.    Renal functions have been abnormal with a BUN 18 with creatinine 1.54 and GFR was reported at 32 however she states that her latest lab work is much better.  Patient was advised to have nephrology consultation.  She will discuss with landen MASON.    Unfortunately patient claims to be allergic to a lot of medications  Clonidine was ordered but it shows as allergy  She also stated that he is allergic to amlodipine    She is taking hydralazine 25 mg 3 times a day  Losartan 100 mg daily    Past Surgical History:   Procedure Laterality Date   • BREAST LUMPECTOMY     • CARDIOVASCULAR STRESS TEST  02/10/2016   • ECHO - CONVERTED  02/10/2016   • HYSTERECTOMY     • SKIN CANCER EXCISION       Family History   Problem Relation Age of Onset   • Heart attack Mother    • Heart disease Mother    • Heart failure Mother    • Heart attack Father    • Heart disease Father    • Heart failure Father    • No Known Problems Sister    • Emphysema Brother      Past Medical History:   Diagnosis Date   • Cancer (CMS/HCC)     skin   • GERD (gastroesophageal reflux disease)    • Hyperlipidemia     • Hypertension      Patient Active Problem List   Diagnosis   • Hypertension   • Hyperlipidemia   • GERD (gastroesophageal reflux disease)   • Arthritis   • Osteoporosis   • Left low back pain   • Atypical chest pain   • Bradycardia   • Stage 2 chronic kidney disease       Social History   Substance Use Topics   • Smoking status: Never Smoker   • Smokeless tobacco: Never Used   • Alcohol use No       Allergies   Allergen Reactions   • Activated Charcoal Itching   • Amlodipine Unknown (See Comments)     .   • Codeine    • Sulfa Antibiotics    • Green Dyes Rash   • Penicillins Rash   • Red Dye Rash       Current Outpatient Prescriptions on File Prior to Visit   Medication Sig   • ALPRAZolam (XANAX) 0.25 MG tablet Take 0.25 mg by mouth at night as needed for anxiety.   • aspirin 81 MG tablet Take 1 tablet by mouth Daily.   • atorvastatin (LIPITOR) 10 MG tablet Take 10 mg by mouth daily.   • Calcium-Magnesium-Vitamin D (CALCIUM 500 PO) Take  by mouth.   • cetirizine (zyrTEC) 10 MG tablet Take 10 mg by mouth Daily.   • cholecalciferol (VITAMIN D3) 1000 UNITS tablet Take 1,000 Units by mouth daily.   • Cyanocobalamin (B-12 IJ) Inject  as directed.   • hydrALAZINE (APRESOLINE) 10 MG tablet Take 1 tablet by mouth 2 (Two) Times a Day.   • HYDROcodone-acetaminophen (NORCO) 5-325 MG per tablet Take 1 tablet by mouth 2 (two) times a day as needed.   • ibandronate (BONIVA) 150 MG tablet Take 150 mg by mouth every 30 (thirty) days.   • metoprolol succinate XL (TOPROL-XL) 25 MG 24 hr tablet Take 1 tablet by mouth Daily.   • ondansetron (ZOFRAN) 4 MG tablet Take 1 tablet by mouth Every 6 (Six) Hours. PRN Nausea/vomiting   • permethrin (ELIMITE) 5 % cream Apply  topically 1 (One) Time.   • traMADol (ULTRAM) 50 MG tablet Take 50 mg by mouth Every 6 (Six) Hours As Needed for Moderate Pain .   • [DISCONTINUED] losartan (COZAAR) 50 MG tablet Take 1 tablet by mouth 2 (Two) Times a Day.     No current facility-administered medications  "on file prior to visit.          The following portions of the patient's history were reviewed and updated as appropriate: allergies, current medications, past family history, past medical history, past social history, past surgical history and problem list.    Review of Systems   Constitution: Negative.   HENT: Negative.  Negative for congestion.    Eyes: Negative.    Cardiovascular: Negative.  Negative for chest pain, cyanosis, dyspnea on exertion, irregular heartbeat, leg swelling, near-syncope, orthopnea, palpitations, paroxysmal nocturnal dyspnea and syncope.   Respiratory: Negative.  Negative for shortness of breath.    Hematologic/Lymphatic: Negative.    Musculoskeletal: Negative.    Gastrointestinal: Negative.    Neurological: Negative.  Negative for headaches.   Psychiatric/Behavioral: The patient is nervous/anxious.    Allergic/Immunologic: Positive for environmental allergies.          Objective:     BP (!) 198/84 (BP Location: Left arm, Patient Position: Sitting)   Pulse 79   Ht 157.5 cm (62\")   Wt 60.8 kg (134 lb)   SpO2 100%   BMI 24.51 kg/m²   Physical Exam   Constitutional: She appears well-developed and well-nourished. No distress.   HENT:   Head: Normocephalic and atraumatic.   Mouth/Throat: Oropharynx is clear and moist. No oropharyngeal exudate.   Eyes: Pupils are equal, round, and reactive to light. Conjunctivae and EOM are normal. No scleral icterus.   Neck: Normal range of motion. Neck supple. No JVD present. No tracheal deviation present. No thyromegaly present.   Cardiovascular: Normal rate, regular rhythm, normal heart sounds and intact distal pulses.  PMI is not displaced.  Exam reveals no gallop, no friction rub and no decreased pulses.    No murmur heard.  Pulses:       Carotid pulses are 3+ on the right side, and 3+ on the left side.       Radial pulses are 3+ on the right side, and 3+ on the left side.   Pulmonary/Chest: Effort normal and breath sounds normal. No respiratory " distress. She has no wheezes. She has no rales. She exhibits no tenderness.   Abdominal: Soft. Bowel sounds are normal. She exhibits no distension, no abdominal bruit and no mass. There is no splenomegaly or hepatomegaly. There is no tenderness. There is no rebound and no guarding.   Musculoskeletal: Normal range of motion. She exhibits no edema, tenderness or deformity.   Lymphadenopathy:     She has no cervical adenopathy.   Neurological: She is alert. She has normal reflexes. No cranial nerve deficit. She exhibits normal muscle tone. Coordination normal.   Skin: Skin is warm and dry. No rash noted. She is not diaphoretic. No erythema.   Psychiatric: She has a normal mood and affect. Her behavior is normal. Judgment and thought content normal.         Lab Review           Procedures       I personally viewed and interpreted the patient's LAB data         Assessment:     1. Essential hypertension    2. Mixed hyperlipidemia    3. Stage 2 chronic kidney disease          Plan:      Blood pressure has been very difficult to control patient claims to be allergic to a lot of medications.  She does not wish to take amlodipine patient states that she is allergic to losartan but she wants to take Diovan.  Blood pressure is quite high today clonidine was ordered but it shows up as allergy .  Labetalol her Bystolic was discussed.  Patient is into that one works the best for her.  Nephrology consultation was recommended.  Patient will discuss that with the her PCP  She was advised to start Diovan 80 twice a day and then she could increase it 160 twice a day if needed according to her Diovan 80 twice a day controls her blood pressure.  She will increase hydralazine 25 mg 4 a day  She will increase Toprol-XL to 50 mg daily  Patient will DC losartan    For now she will go to the emergency room because she does not wish to take clonidine ,amlodipine ,labetalol or Bystolic.  Allergy shows up to hydralazine also while ordering med  but patient is taking it.  Patient cannot take hydrochlorothiazide or Hygroton because of renal failure.    Will request nephrology consult            No Follow-up on file.

## 2018-12-07 ENCOUNTER — TRANSCRIBE ORDERS (OUTPATIENT)
Dept: ADMINISTRATIVE | Facility: HOSPITAL | Age: 78
End: 2018-12-07

## 2018-12-07 ENCOUNTER — HOSPITAL ENCOUNTER (OUTPATIENT)
Dept: ULTRASOUND IMAGING | Facility: HOSPITAL | Age: 78
Discharge: HOME | End: 2018-12-07

## 2018-12-07 ENCOUNTER — HOSPITAL ENCOUNTER (OUTPATIENT)
Dept: MAMMOGRAPHY | Facility: HOSPITAL | Age: 78
Discharge: HOME | End: 2018-12-07
Admitting: NURSE PRACTITIONER

## 2018-12-07 DIAGNOSIS — R10.9 ABDOMINAL PAIN, UNSPECIFIED ABDOMINAL LOCATION: Primary | ICD-10-CM

## 2018-12-07 DIAGNOSIS — R10.9 ABDOMINAL PAIN, UNSPECIFIED ABDOMINAL LOCATION: ICD-10-CM

## 2018-12-07 DIAGNOSIS — Z12.39 SCREENING BREAST EXAMINATION: ICD-10-CM

## 2018-12-07 PROCEDURE — 77063 BREAST TOMOSYNTHESIS BI: CPT | Performed by: RADIOLOGY

## 2018-12-07 PROCEDURE — 77067 SCR MAMMO BI INCL CAD: CPT | Performed by: RADIOLOGY

## 2018-12-07 PROCEDURE — 76700 US EXAM ABDOM COMPLETE: CPT

## 2018-12-07 PROCEDURE — 76700 US EXAM ABDOM COMPLETE: CPT | Performed by: RADIOLOGY

## 2018-12-07 PROCEDURE — 77063 BREAST TOMOSYNTHESIS BI: CPT

## 2018-12-07 PROCEDURE — 77067 SCR MAMMO BI INCL CAD: CPT

## 2019-01-04 ENCOUNTER — OFFICE VISIT (OUTPATIENT)
Dept: CARDIOLOGY | Facility: CLINIC | Age: 79
End: 2019-01-04

## 2019-01-04 VITALS
HEART RATE: 70 BPM | SYSTOLIC BLOOD PRESSURE: 136 MMHG | BODY MASS INDEX: 24.11 KG/M2 | WEIGHT: 131 LBS | HEIGHT: 62 IN | DIASTOLIC BLOOD PRESSURE: 78 MMHG | OXYGEN SATURATION: 98 %

## 2019-01-04 DIAGNOSIS — E78.2 MIXED HYPERLIPIDEMIA: ICD-10-CM

## 2019-01-04 DIAGNOSIS — I10 ESSENTIAL HYPERTENSION: Primary | ICD-10-CM

## 2019-01-04 PROCEDURE — 99213 OFFICE O/P EST LOW 20 MIN: CPT | Performed by: INTERNAL MEDICINE

## 2019-01-04 NOTE — PROGRESS NOTES
subjective     Chief Complaint   Patient presents with   • Hypertension   • Hyperlipidemia   • Follow-up     History of Present Illness  Patient is 78 years old white female who is here for cardiology follow-up.  Her blood pressure is very well controlled at this time.  She is taking hydralazine 25 mg 4 times daily Toprol-XL 50 mg daily and I will and 80 mg twice a day and aspirin 81 daily.  She is tolerating medications very well.  Blood pressure is running around 130s systolic.    She also has hyperlipidemia and is following closely with her PCP  She has no new complaints today    Past Surgical History:   Procedure Laterality Date   • BREAST BIOPSY Bilateral yrs ago    benign   • BREAST LUMPECTOMY     • CARDIOVASCULAR STRESS TEST  02/10/2016   • ECHO - CONVERTED  02/10/2016   • HYSTERECTOMY      44   • SKIN CANCER EXCISION       Family History   Problem Relation Age of Onset   • Heart attack Mother    • Heart disease Mother    • Heart failure Mother    • Heart attack Father    • Heart disease Father    • Heart failure Father    • No Known Problems Sister    • Emphysema Brother    • Breast cancer Neg Hx      Past Medical History:   Diagnosis Date   • Cancer (CMS/HCC)     skin   • Chronic kidney disease    • GERD (gastroesophageal reflux disease)    • Hyperlipidemia    • Hypertension      Patient Active Problem List   Diagnosis   • Hypertension   • Hyperlipidemia   • GERD (gastroesophageal reflux disease)   • Arthritis   • Osteoporosis   • Left low back pain   • Atypical chest pain   • Bradycardia   • Stage 2 chronic kidney disease       Social History     Tobacco Use   • Smoking status: Never Smoker   • Smokeless tobacco: Never Used   Substance Use Topics   • Alcohol use: No   • Drug use: No       Allergies   Allergen Reactions   • Activated Charcoal Itching   • Amlodipine Unknown (See Comments)     .   • Codeine    • Sulfa Antibiotics    • Green Dyes Rash   • Penicillins Rash   • Red Dye Rash       Current  Outpatient Medications on File Prior to Visit   Medication Sig   • ALPRAZolam (XANAX) 0.25 MG tablet Take 0.25 mg by mouth at night as needed for anxiety.   • aspirin 81 MG tablet Take 1 tablet by mouth Daily.   • atorvastatin (LIPITOR) 10 MG tablet Take 10 mg by mouth daily.   • Calcium-Magnesium-Vitamin D (CALCIUM 500 PO) Take  by mouth.   • cetirizine (zyrTEC) 10 MG tablet Take 10 mg by mouth Daily.   • cholecalciferol (VITAMIN D3) 1000 UNITS tablet Take 1,000 Units by mouth daily.   • Cyanocobalamin (B-12 IJ) Inject  as directed.   • hydrALAZINE (APRESOLINE) 25 MG tablet Take 1 tablet by mouth 4 (Four) Times a Day.   • HYDROcodone-acetaminophen (NORCO) 5-325 MG per tablet Take 1 tablet by mouth 2 (two) times a day as needed.   • ibandronate (BONIVA) 150 MG tablet Take 150 mg by mouth every 30 (thirty) days.   • metoprolol succinate XL (TOPROL-XL) 25 MG 24 hr tablet Take 2 tablets by mouth Daily.   • ondansetron (ZOFRAN) 4 MG tablet Take 1 tablet by mouth Every 6 (Six) Hours. PRN Nausea/vomiting   • permethrin (ELIMITE) 5 % cream Apply  topically 1 (One) Time.   • traMADol (ULTRAM) 50 MG tablet Take 50 mg by mouth Every 6 (Six) Hours As Needed for Moderate Pain .   • valsartan (DIOVAN) 80 MG tablet Take 1 tablet by mouth 2 (Two) Times a Day.     No current facility-administered medications on file prior to visit.          The following portions of the patient's history were reviewed and updated as appropriate: allergies, current medications, past family history, past medical history, past social history, past surgical history and problem list.    Review of Systems   Constitution: Negative.   HENT: Negative.  Negative for congestion.    Eyes: Negative.    Cardiovascular: Negative.  Negative for chest pain, cyanosis, dyspnea on exertion, irregular heartbeat, leg swelling, near-syncope, orthopnea, palpitations, paroxysmal nocturnal dyspnea and syncope.   Respiratory: Negative.  Negative for shortness of breath.   "  Hematologic/Lymphatic: Negative.    Musculoskeletal: Negative.    Gastrointestinal: Negative.    Neurological: Negative.  Negative for headaches.          Objective:     /78 (BP Location: Left arm, Patient Position: Sitting)   Pulse 70   Ht 157.5 cm (62\")   Wt 59.4 kg (131 lb)   SpO2 98%   BMI 23.96 kg/m²   Physical Exam   Constitutional: She appears well-developed and well-nourished. No distress.   HENT:   Head: Normocephalic and atraumatic.   Mouth/Throat: Oropharynx is clear and moist. No oropharyngeal exudate.   Eyes: Conjunctivae and EOM are normal. Pupils are equal, round, and reactive to light. No scleral icterus.   Neck: Normal range of motion. Neck supple. No JVD present. No tracheal deviation present. No thyromegaly present.   Cardiovascular: Normal rate, regular rhythm, normal heart sounds and intact distal pulses. PMI is not displaced. Exam reveals no gallop, no friction rub and no decreased pulses.   No murmur heard.  Pulses:       Carotid pulses are 3+ on the right side, and 3+ on the left side.       Radial pulses are 3+ on the right side, and 3+ on the left side.   Pulmonary/Chest: Effort normal and breath sounds normal. No respiratory distress. She has no wheezes. She has no rales. She exhibits no tenderness.   Abdominal: Soft. Bowel sounds are normal. She exhibits no distension, no abdominal bruit and no mass. There is no splenomegaly or hepatomegaly. There is no tenderness. There is no rebound and no guarding.   Musculoskeletal: Normal range of motion. She exhibits no edema, tenderness or deformity.   Lymphadenopathy:     She has no cervical adenopathy.   Neurological: She is alert. She has normal reflexes. No cranial nerve deficit. She exhibits normal muscle tone. Coordination normal.   Skin: Skin is warm and dry. No rash noted. She is not diaphoretic. No erythema.   Psychiatric: She has a normal mood and affect. Her behavior is normal. Judgment and thought content normal. "         Lab Review    Procedures       I personally viewed and interpreted the patient's LAB data         Assessment:     1. Essential hypertension    2. Mixed hyperlipidemia          Plan:      Patient is doing very well blood pressure is very well controlled she was advised to continue current medications which consist of hydralazine 25 mg 4 times a day  Toprol-XL 50 mg daily  Diovan 160 daily.  No change in therapy was made.  She is also taking Lipitor 10 mg daily without any side effects.          No Follow-up on file.

## 2019-04-12 ENCOUNTER — OFFICE VISIT (OUTPATIENT)
Dept: CARDIOLOGY | Facility: CLINIC | Age: 79
End: 2019-04-12

## 2019-04-12 VITALS
OXYGEN SATURATION: 99 % | HEART RATE: 64 BPM | BODY MASS INDEX: 24.11 KG/M2 | SYSTOLIC BLOOD PRESSURE: 110 MMHG | WEIGHT: 131 LBS | HEIGHT: 62 IN | DIASTOLIC BLOOD PRESSURE: 66 MMHG

## 2019-04-12 DIAGNOSIS — R00.2 PALPITATIONS: ICD-10-CM

## 2019-04-12 DIAGNOSIS — I10 ESSENTIAL HYPERTENSION: Primary | ICD-10-CM

## 2019-04-12 PROCEDURE — 99213 OFFICE O/P EST LOW 20 MIN: CPT | Performed by: INTERNAL MEDICINE

## 2019-04-12 NOTE — PROGRESS NOTES
subjective     Chief Complaint   Patient presents with   • Hypertension   • Follow-up     History of Present Illness  Patient is here for cardiology follow-up.  Patient has essential hypertension which had been difficult to control.  Now patient says the blood pressure is running normal for quite some time actually it goes low.  Also she feels that Apresoline causes tachycardia if she takes 4 doses.  Currently she is taking hydralazine 25 mg 4 times daily along with Toprol-XL 25 mg 2 daily and Diovan 80 twice daily.  She denies any chest pain palpitations shortness of breath dizziness syncope or presyncope  She has multiple other chronic medical problems and is following closely with the [facility].    Past Surgical History:   Procedure Laterality Date   • BREAST BIOPSY Bilateral yrs ago    benign   • BREAST LUMPECTOMY     • CARDIOVASCULAR STRESS TEST  02/10/2016   • ECHO - CONVERTED  02/10/2016   • HYSTERECTOMY      44   • SKIN CANCER EXCISION       Family History   Problem Relation Age of Onset   • Heart attack Mother    • Heart disease Mother    • Heart failure Mother    • Heart attack Father    • Heart disease Father    • Heart failure Father    • No Known Problems Sister    • Emphysema Brother    • Breast cancer Neg Hx      Past Medical History:   Diagnosis Date   • Cancer (CMS/HCC)     skin   • Chronic kidney disease    • GERD (gastroesophageal reflux disease)    • Hyperlipidemia    • Hypertension      Patient Active Problem List   Diagnosis   • Hypertension   • Hyperlipidemia   • GERD (gastroesophageal reflux disease)   • Arthritis   • Osteoporosis   • Left low back pain   • Atypical chest pain   • Bradycardia   • Stage 2 chronic kidney disease   • Palpitations       Social History     Tobacco Use   • Smoking status: Never Smoker   • Smokeless tobacco: Never Used   Substance Use Topics   • Alcohol use: No   • Drug use: No       Allergies   Allergen Reactions   • Activated Charcoal Itching   •  Amlodipine Unknown (See Comments)     .   • Codeine    • Sulfa Antibiotics    • Green Dyes Rash   • Penicillins Rash   • Red Dye Rash       Current Outpatient Medications on File Prior to Visit   Medication Sig   • ALPRAZolam (XANAX) 0.25 MG tablet Take 0.25 mg by mouth at night as needed for anxiety.   • aspirin 81 MG tablet Take 1 tablet by mouth Daily.   • atorvastatin (LIPITOR) 10 MG tablet Take 10 mg by mouth daily.   • Calcium-Magnesium-Vitamin D (CALCIUM 500 PO) Take  by mouth.   • cetirizine (zyrTEC) 10 MG tablet Take 10 mg by mouth Daily.   • cholecalciferol (VITAMIN D3) 1000 UNITS tablet Take 1,000 Units by mouth daily.   • Cyanocobalamin (B-12 IJ) Inject  as directed.   • hydrALAZINE (APRESOLINE) 25 MG tablet Take 1 tablet by mouth 4 (Four) Times a Day.   • HYDROcodone-acetaminophen (NORCO) 5-325 MG per tablet Take 1 tablet by mouth 2 (two) times a day as needed.   • ibandronate (BONIVA) 150 MG tablet Take 150 mg by mouth every 30 (thirty) days.   • metoprolol succinate XL (TOPROL-XL) 25 MG 24 hr tablet Take 2 tablets by mouth Daily.   • ondansetron (ZOFRAN) 4 MG tablet Take 1 tablet by mouth Every 6 (Six) Hours. PRN Nausea/vomiting   • permethrin (ELIMITE) 5 % cream Apply  topically 1 (One) Time.   • traMADol (ULTRAM) 50 MG tablet Take 50 mg by mouth Every 6 (Six) Hours As Needed for Moderate Pain .   • valsartan (DIOVAN) 80 MG tablet Take 1 tablet by mouth 2 (Two) Times a Day.     No current facility-administered medications on file prior to visit.          The following portions of the patient's history were reviewed and updated as appropriate: allergies, current medications, past family history, past medical history, past social history, past surgical history and problem list.    Review of Systems   Constitution: Negative.   HENT: Negative.  Negative for congestion.    Eyes: Negative.    Cardiovascular: Negative.  Negative for chest pain, cyanosis, dyspnea on exertion, irregular heartbeat, leg  "swelling, near-syncope, orthopnea, palpitations, paroxysmal nocturnal dyspnea and syncope.   Respiratory: Negative.  Negative for shortness of breath.    Hematologic/Lymphatic: Negative.    Musculoskeletal: Negative.    Gastrointestinal: Negative.    Neurological: Negative.  Negative for headaches.          Objective:     /66 (BP Location: Left arm, Patient Position: Sitting)   Pulse 64   Ht 157.5 cm (62\")   Wt 59.4 kg (131 lb)   SpO2 99%   BMI 23.96 kg/m²   Physical Exam   Constitutional: She appears well-developed and well-nourished. No distress.   HENT:   Head: Normocephalic and atraumatic.   Mouth/Throat: Oropharynx is clear and moist. No oropharyngeal exudate.   Eyes: Conjunctivae and EOM are normal. Pupils are equal, round, and reactive to light. No scleral icterus.   Neck: Normal range of motion. Neck supple. No JVD present. No tracheal deviation present. No thyromegaly present.   Cardiovascular: Normal rate, regular rhythm, normal heart sounds and intact distal pulses. PMI is not displaced. Exam reveals no gallop, no friction rub and no decreased pulses.   No murmur heard.  Pulses:       Carotid pulses are 3+ on the right side, and 3+ on the left side.       Radial pulses are 3+ on the right side, and 3+ on the left side.   Pulmonary/Chest: Effort normal and breath sounds normal. No respiratory distress. She has no wheezes. She has no rales. She exhibits no tenderness.   Abdominal: Soft. Bowel sounds are normal. She exhibits no distension, no abdominal bruit and no mass. There is no splenomegaly or hepatomegaly. There is no tenderness. There is no rebound and no guarding.   Musculoskeletal: Normal range of motion. She exhibits no edema, tenderness or deformity.   Lymphadenopathy:     She has no cervical adenopathy.   Neurological: She is alert. She has normal reflexes. No cranial nerve deficit. She exhibits normal muscle tone. Coordination normal.   Skin: Skin is warm and dry. No rash noted. She " is not diaphoretic. No erythema.   Psychiatric: She has a normal mood and affect. Her behavior is normal. Judgment and thought content normal.         Lab Review  Lab Results   Component Value Date     07/23/2017    K 4.5 07/23/2017     07/23/2017    BUN 14 07/23/2017    CREATININE 1.07 07/23/2017    GLUCOSE 112 (H) 07/23/2017    CALCIUM 9.7 07/23/2017    ALT 13 07/23/2017    ALKPHOS 56 07/23/2017     Lab Results   Component Value Date    CKTOTAL 64 07/23/2017     Lab Results   Component Value Date    WBC 7.20 07/23/2017    HGB 12.1 07/23/2017    HCT 37.5 07/23/2017     07/23/2017     No results found for: INR  No results found for: MG  No results found for: PSA, TSH  Lab Results   Component Value Date    BNP 66.0 07/23/2017     No results found for: CHLPL, CHOL, TRIG, HDL, VLDL, LDLHDL  No results found for: LDL    Procedures       I personally viewed and interpreted the patient's LAB data         Assessment:     1. Essential hypertension    2. Palpitations          Plan:     Blood pressure is very well controlled.  Also patient feels that with 4 doses of hydralazine she gets tachycardia.  She was advised to decrease hydralazine 25 3 times daily.  She will continue metoprolol XL 25 twice a day and Diovan 80 twice daily.  If she still has palpitations or other symptoms she would call our office.      No Follow-up on file.

## 2019-07-12 ENCOUNTER — OFFICE VISIT (OUTPATIENT)
Dept: CARDIOLOGY | Facility: CLINIC | Age: 79
End: 2019-07-12

## 2019-07-12 VITALS
BODY MASS INDEX: 24.84 KG/M2 | OXYGEN SATURATION: 98 % | WEIGHT: 135 LBS | SYSTOLIC BLOOD PRESSURE: 110 MMHG | HEART RATE: 65 BPM | HEIGHT: 62 IN | DIASTOLIC BLOOD PRESSURE: 70 MMHG

## 2019-07-12 DIAGNOSIS — I10 ESSENTIAL HYPERTENSION: Primary | ICD-10-CM

## 2019-07-12 DIAGNOSIS — N18.2 STAGE 2 CHRONIC KIDNEY DISEASE: ICD-10-CM

## 2019-07-12 DIAGNOSIS — R00.2 PALPITATIONS: ICD-10-CM

## 2019-07-12 PROCEDURE — 99213 OFFICE O/P EST LOW 20 MIN: CPT | Performed by: INTERNAL MEDICINE

## 2019-07-12 RX ORDER — VALSARTAN 80 MG/1
40 TABLET ORAL DAILY
Qty: 30 TABLET | Refills: 0 | Status: SHIPPED | OUTPATIENT
Start: 2019-07-12 | End: 2019-09-06 | Stop reason: SDUPTHER

## 2019-07-12 RX ORDER — METOPROLOL SUCCINATE 25 MG/1
25 TABLET, EXTENDED RELEASE ORAL DAILY
Qty: 90 TABLET | Refills: 0 | Status: SHIPPED | OUTPATIENT
Start: 2019-07-12 | End: 2020-01-10 | Stop reason: SDUPTHER

## 2019-07-12 RX ORDER — HYDRALAZINE HYDROCHLORIDE 25 MG/1
25 TABLET, FILM COATED ORAL 2 TIMES DAILY
Qty: 90 TABLET | Refills: 0 | Status: SHIPPED | OUTPATIENT
Start: 2019-07-12

## 2019-07-12 NOTE — PROGRESS NOTES
subjective     Chief Complaint   Patient presents with   • Hypertension   • Follow-up     History of Present Illness    Patient is 79 years old white female who is here for cardiology follow-up.  Patient has essential hypertension.  Blood pressure is low today.  She is taking Diovan 80 mg daily, metoprolol XL 25 daily and hydralazine 25 twice daily  Patient has no specific complaints today.  She has been doing very well from cardiac standpoint  Hyperlipidemia is being treated with Lipitor through her PCP    Patient had recent lab work which will be reviewed    Past Surgical History:   Procedure Laterality Date   • BREAST BIOPSY Bilateral yrs ago    benign   • BREAST LUMPECTOMY     • CARDIOVASCULAR STRESS TEST  02/10/2016   • ECHO - CONVERTED  02/10/2016   • HYSTERECTOMY      44   • SKIN CANCER EXCISION       Family History   Problem Relation Age of Onset   • Heart attack Mother    • Heart disease Mother    • Heart failure Mother    • Heart attack Father    • Heart disease Father    • Heart failure Father    • No Known Problems Sister    • Emphysema Brother    • Breast cancer Neg Hx      Past Medical History:   Diagnosis Date   • Cancer (CMS/HCC)     skin   • Chronic kidney disease    • GERD (gastroesophageal reflux disease)    • Hyperlipidemia    • Hypertension      Patient Active Problem List   Diagnosis   • Hypertension   • Hyperlipidemia   • GERD (gastroesophageal reflux disease)   • Arthritis   • Osteoporosis   • Left low back pain   • Atypical chest pain   • Bradycardia   • Stage 2 chronic kidney disease   • Palpitations       Social History     Tobacco Use   • Smoking status: Never Smoker   • Smokeless tobacco: Never Used   Substance Use Topics   • Alcohol use: No   • Drug use: No       Allergies   Allergen Reactions   • Activated Charcoal Itching   • Amlodipine Unknown (See Comments)     .   • Codeine    • Sulfa Antibiotics    • Green Dyes Rash   • Penicillins Rash   • Red Dye Rash       Current Outpatient  Medications on File Prior to Visit   Medication Sig   • ALPRAZolam (XANAX) 0.25 MG tablet Take 0.25 mg by mouth at night as needed for anxiety.   • aspirin 81 MG tablet Take 1 tablet by mouth Daily.   • atorvastatin (LIPITOR) 10 MG tablet Take 10 mg by mouth daily.   • Calcium-Magnesium-Vitamin D (CALCIUM 500 PO) Take  by mouth.   • cetirizine (zyrTEC) 10 MG tablet Take 10 mg by mouth Daily.   • cholecalciferol (VITAMIN D3) 1000 UNITS tablet Take 1,000 Units by mouth daily.   • Cyanocobalamin (B-12 IJ) Inject  as directed.   • HYDROcodone-acetaminophen (NORCO) 5-325 MG per tablet Take 1 tablet by mouth 2 (two) times a day as needed.   • ibandronate (BONIVA) 150 MG tablet Take 150 mg by mouth every 30 (thirty) days.   • ondansetron (ZOFRAN) 4 MG tablet Take 1 tablet by mouth Every 6 (Six) Hours. PRN Nausea/vomiting   • permethrin (ELIMITE) 5 % cream Apply  topically 1 (One) Time.   • traMADol (ULTRAM) 50 MG tablet Take 50 mg by mouth Every 6 (Six) Hours As Needed for Moderate Pain .   • [DISCONTINUED] hydrALAZINE (APRESOLINE) 25 MG tablet Take 1 tablet by mouth 4 (Four) Times a Day.   • [DISCONTINUED] metoprolol succinate XL (TOPROL-XL) 25 MG 24 hr tablet Take 2 tablets by mouth Daily.   • [DISCONTINUED] valsartan (DIOVAN) 80 MG tablet Take 1 tablet by mouth 2 (Two) Times a Day.     No current facility-administered medications on file prior to visit.          The following portions of the patient's history were reviewed and updated as appropriate: allergies, current medications, past family history, past medical history, past social history, past surgical history and problem list.    Review of Systems   Constitution: Negative.   HENT: Negative.  Negative for congestion.    Eyes: Negative.    Cardiovascular: Negative.  Negative for chest pain, cyanosis, dyspnea on exertion, irregular heartbeat, leg swelling, near-syncope, orthopnea, palpitations, paroxysmal nocturnal dyspnea and syncope.   Respiratory: Negative.   "Negative for shortness of breath.    Hematologic/Lymphatic: Negative.    Musculoskeletal: Positive for arthritis and back pain.   Gastrointestinal: Negative.    Neurological: Negative.  Negative for headaches.          Objective:     /70   Pulse 65   Ht 157.5 cm (62\")   Wt 61.2 kg (135 lb)   SpO2 98%   BMI 24.69 kg/m²   Physical Exam   Constitutional: She appears well-developed and well-nourished. No distress.   HENT:   Head: Normocephalic and atraumatic.   Mouth/Throat: Oropharynx is clear and moist. No oropharyngeal exudate.   Eyes: Conjunctivae and EOM are normal. Pupils are equal, round, and reactive to light. No scleral icterus.   Neck: Normal range of motion. Neck supple. No JVD present. No tracheal deviation present. No thyromegaly present.   Cardiovascular: Normal rate, regular rhythm, normal heart sounds and intact distal pulses. PMI is not displaced. Exam reveals no gallop, no friction rub and no decreased pulses.   No murmur heard.  Pulses:       Carotid pulses are 3+ on the right side, and 3+ on the left side.       Radial pulses are 3+ on the right side, and 3+ on the left side.   Pulmonary/Chest: Effort normal and breath sounds normal. No respiratory distress. She has no wheezes. She has no rales. She exhibits no tenderness.   Abdominal: Soft. Bowel sounds are normal. She exhibits no distension, no abdominal bruit and no mass. There is no splenomegaly or hepatomegaly. There is no tenderness. There is no rebound and no guarding.   Musculoskeletal: Normal range of motion. She exhibits no edema, tenderness or deformity.   Lymphadenopathy:     She has no cervical adenopathy.   Neurological: She is alert. She has normal reflexes. No cranial nerve deficit. She exhibits normal muscle tone. Coordination normal.   Skin: Skin is warm and dry. No rash noted. She is not diaphoretic. No erythema.   Psychiatric: She has a normal mood and affect. Her behavior is normal. Judgment and thought content normal. "         Lab Review      Procedures       I personally viewed and interpreted the patient's LAB data         Assessment:     1. Essential hypertension    2. Stage 2 chronic kidney disease    3. Palpitations          Plan:   Labs reviewed and discussed with the patient  Creatinine is 1.42 with estimated GFR of 35.  Last creatinine was 1.29    Blood pressure is overly controlled and patient has mild renal failure which is getting worse.  Patient was advised to decrease blood pressure medications.  Patient is very reluctant however she agreed to decrease Diovan 40 mg daily.  She will drink more fluids  Follow-up scheduled    .      No Follow-up on file.

## 2019-09-06 RX ORDER — VALSARTAN 80 MG/1
40 TABLET ORAL DAILY
Qty: 30 TABLET | Refills: 1 | Status: SHIPPED | OUTPATIENT
Start: 2019-09-06 | End: 2019-10-11

## 2019-10-11 ENCOUNTER — OFFICE VISIT (OUTPATIENT)
Dept: CARDIOLOGY | Facility: CLINIC | Age: 79
End: 2019-10-11

## 2019-10-11 VITALS
WEIGHT: 127 LBS | HEART RATE: 58 BPM | BODY MASS INDEX: 23.37 KG/M2 | HEIGHT: 62 IN | SYSTOLIC BLOOD PRESSURE: 160 MMHG | OXYGEN SATURATION: 99 % | DIASTOLIC BLOOD PRESSURE: 84 MMHG

## 2019-10-11 DIAGNOSIS — I10 ESSENTIAL HYPERTENSION: Primary | ICD-10-CM

## 2019-10-11 DIAGNOSIS — R00.1 BRADYCARDIA: ICD-10-CM

## 2019-10-11 PROCEDURE — 99213 OFFICE O/P EST LOW 20 MIN: CPT | Performed by: INTERNAL MEDICINE

## 2019-10-11 RX ORDER — VALSARTAN 80 MG/1
160 TABLET ORAL DAILY
Qty: 60 TABLET | Refills: 1 | Status: SHIPPED | OUTPATIENT
Start: 2019-10-11 | End: 2020-01-10 | Stop reason: SDUPTHER
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
## 2020-01-10 ENCOUNTER — OFFICE VISIT (OUTPATIENT)
Dept: CARDIOLOGY | Facility: CLINIC | Age: 80
End: 2020-01-10

## 2020-01-10 VITALS
WEIGHT: 130 LBS | SYSTOLIC BLOOD PRESSURE: 138 MMHG | BODY MASS INDEX: 23.92 KG/M2 | HEIGHT: 62 IN | OXYGEN SATURATION: 96 % | DIASTOLIC BLOOD PRESSURE: 82 MMHG | HEART RATE: 76 BPM

## 2020-01-10 DIAGNOSIS — I10 ESSENTIAL HYPERTENSION: ICD-10-CM

## 2020-01-10 DIAGNOSIS — E78.2 MIXED HYPERLIPIDEMIA: ICD-10-CM

## 2020-01-10 DIAGNOSIS — R00.2 PALPITATIONS: Primary | ICD-10-CM

## 2020-01-10 PROCEDURE — 99213 OFFICE O/P EST LOW 20 MIN: CPT | Performed by: INTERNAL MEDICINE

## 2020-01-10 RX ORDER — METOPROLOL SUCCINATE 25 MG/1
25 TABLET, EXTENDED RELEASE ORAL DAILY
Qty: 180 TABLET | Refills: 3 | Status: SHIPPED | OUTPATIENT
Start: 2020-01-10 | End: 2020-01-10 | Stop reason: SDUPTHER

## 2020-01-10 RX ORDER — METOPROLOL SUCCINATE 25 MG/1
25 TABLET, EXTENDED RELEASE ORAL DAILY
Qty: 90 TABLET | Refills: 3 | Status: SHIPPED | OUTPATIENT
Start: 2020-01-10

## 2020-01-10 RX ORDER — VALSARTAN 80 MG/1
160 TABLET ORAL DAILY
Qty: 90 TABLET | Refills: 3 | Status: SHIPPED | OUTPATIENT
Start: 2020-01-10 | End: 2020-01-10 | Stop reason: SDUPTHER

## 2020-01-10 RX ORDER — VALSARTAN 80 MG/1
160 TABLET ORAL DAILY
Qty: 180 TABLET | Refills: 3 | Status: SHIPPED | OUTPATIENT
Start: 2020-01-10

## 2020-01-10 NOTE — PROGRESS NOTES
subjective     Chief Complaint   Patient presents with   • Hypertension     pt had only been taking valsartan 1 daily   • Hyperlipidemia     states she is getting labs next wk w/ pcp     History of Present Illness  Patient is 79 years old white female who is here for cardiology follow-up.  Patient has history of hypertension which is well controlled according to her on valsartan 80 mg daily and Toprol-XL 25 mg daily which she takes for palpitations.  He also takes hydralazine 25 twice daily.  Patient was supposed to be taking valsartan 160 but she takes only 80 because according to her blood pressure is completely normal.  She also has hyperlipidemia and is tolerating Lipitor very well.    Past Surgical History:   Procedure Laterality Date   • BREAST BIOPSY Bilateral yrs ago    benign   • BREAST LUMPECTOMY     • CARDIOVASCULAR STRESS TEST  02/10/2016   • ECHO - CONVERTED  02/10/2016   • HYSTERECTOMY      44   • SKIN CANCER EXCISION       Family History   Problem Relation Age of Onset   • Heart attack Mother    • Heart disease Mother    • Heart failure Mother    • Heart attack Father    • Heart disease Father    • Heart failure Father    • No Known Problems Sister    • Emphysema Brother    • Breast cancer Neg Hx      Past Medical History:   Diagnosis Date   • Cancer (CMS/HCC)     skin   • Chronic kidney disease    • GERD (gastroesophageal reflux disease)    • Hyperlipidemia    • Hypertension      Patient Active Problem List   Diagnosis   • Hypertension   • Hyperlipidemia   • GERD (gastroesophageal reflux disease)   • Arthritis   • Osteoporosis   • Left low back pain   • Atypical chest pain   • Bradycardia   • Stage 2 chronic kidney disease   • Palpitations       Social History     Tobacco Use   • Smoking status: Never Smoker   • Smokeless tobacco: Never Used   Substance Use Topics   • Alcohol use: No   • Drug use: No       Allergies   Allergen Reactions   • Activated Charcoal Itching   • Codeine GI Intolerance   •  Sulfa Antibiotics Hives   • Amlodipine Unknown (See Comments)     .cant remember     • Doxycycline Palpitations   • Green Dyes Rash   • Penicillins Rash   • Red Dye Rash       Current Outpatient Medications on File Prior to Visit   Medication Sig   • ALPRAZolam (XANAX) 0.25 MG tablet Take 0.25 mg by mouth at night as needed for anxiety.   • aspirin 81 MG tablet Take 1 tablet by mouth Daily.   • atorvastatin (LIPITOR) 10 MG tablet Take 10 mg by mouth daily.   • Calcium-Magnesium-Vitamin D (CALCIUM 500 PO) Take  by mouth.   • cetirizine (zyrTEC) 10 MG tablet Take 10 mg by mouth Daily.   • cholecalciferol (VITAMIN D3) 1000 UNITS tablet Take 1,000 Units by mouth daily.   • Cyanocobalamin (B-12 IJ) Inject  as directed.   • hydrALAZINE (APRESOLINE) 25 MG tablet Take 1 tablet by mouth 2 (Two) Times a Day.   • HYDROcodone-acetaminophen (NORCO) 5-325 MG per tablet Take 1 tablet by mouth 2 (two) times a day as needed.   • ibandronate (BONIVA) 150 MG tablet Take 150 mg by mouth every 30 (thirty) days.   • ondansetron (ZOFRAN) 4 MG tablet Take 1 tablet by mouth Every 6 (Six) Hours. PRN Nausea/vomiting   • permethrin (ELIMITE) 5 % cream Apply  topically 1 (One) Time.   • traMADol (ULTRAM) 50 MG tablet Take 50 mg by mouth Every 6 (Six) Hours As Needed for Moderate Pain .   • [DISCONTINUED] metoprolol succinate XL (TOPROL-XL) 25 MG 24 hr tablet Take 1 tablet by mouth Daily.   • [DISCONTINUED] valsartan (DIOVAN) 80 MG tablet Take 2 tablets by mouth Daily.     No current facility-administered medications on file prior to visit.          The following portions of the patient's history were reviewed and updated as appropriate: allergies, current medications, past family history, past medical history, past social history, past surgical history and problem list.    Review of Systems   Constitution: Negative.   HENT: Negative.  Negative for congestion.    Eyes: Negative.    Cardiovascular: Negative.  Negative for chest pain, cyanosis,  "dyspnea on exertion, irregular heartbeat, leg swelling, near-syncope, orthopnea, palpitations, paroxysmal nocturnal dyspnea and syncope.   Respiratory: Negative.  Negative for shortness of breath.    Hematologic/Lymphatic: Negative.    Musculoskeletal: Positive for arthritis.   Gastrointestinal: Negative.    Neurological: Negative.  Negative for headaches.          Objective:     /82   Pulse 76   Ht 157.5 cm (62\")   Wt 59 kg (130 lb)   SpO2 96%   BMI 23.78 kg/m²   Physical Exam   Constitutional: She appears well-developed and well-nourished. No distress.   HENT:   Head: Normocephalic and atraumatic.   Mouth/Throat: Oropharynx is clear and moist. No oropharyngeal exudate.   Eyes: Pupils are equal, round, and reactive to light. Conjunctivae and EOM are normal. No scleral icterus.   Neck: Normal range of motion. Neck supple. No JVD present. No tracheal deviation present. No thyromegaly present.   Cardiovascular: Normal rate, regular rhythm, normal heart sounds and intact distal pulses. PMI is not displaced. Exam reveals no gallop, no friction rub and no decreased pulses.   No murmur heard.  Pulses:       Carotid pulses are 3+ on the right side, and 3+ on the left side.       Radial pulses are 3+ on the right side, and 3+ on the left side.   Pulmonary/Chest: Effort normal and breath sounds normal. No respiratory distress. She has no wheezes. She has no rales. She exhibits no tenderness.   Abdominal: Soft. Bowel sounds are normal. She exhibits no distension, no abdominal bruit and no mass. There is no splenomegaly or hepatomegaly. There is no tenderness. There is no rebound and no guarding.   Musculoskeletal: Normal range of motion. She exhibits no edema, tenderness or deformity.   Lymphadenopathy:     She has no cervical adenopathy.   Neurological: She is alert. She has normal reflexes. No cranial nerve deficit. She exhibits normal muscle tone. Coordination normal.   Skin: Skin is warm and dry. No rash noted. " She is not diaphoretic. No erythema.   Psychiatric: She has a normal mood and affect. Her behavior is normal. Judgment and thought content normal.         Lab Review  No lab work available  Procedures       I personally viewed and interpreted the patient's LAB data         Assessment:     1. Palpitations    2. Essential hypertension    3. Mixed hyperlipidemia          Plan:     Patient was advised to continue valsartan 80 mg daily however she was encouraged to take 160 if needed.  She will try to keep her blood pressure around 130 systolic.  She will continue Lipitor and is being monitored closely by her PCP.  Palpitations are very well controlled with low-dose Toprol.  Heart rate is running around 76 today.  Healthy lifestyle discussed follow-up scheduled      No follow-ups on file.

## 2020-04-10 ENCOUNTER — OFFICE VISIT (OUTPATIENT)
Dept: CARDIOLOGY | Facility: CLINIC | Age: 80
End: 2020-04-10

## 2020-04-10 VITALS — SYSTOLIC BLOOD PRESSURE: 142 MMHG | HEART RATE: 60 BPM | DIASTOLIC BLOOD PRESSURE: 82 MMHG

## 2020-04-10 DIAGNOSIS — I10 ESSENTIAL HYPERTENSION: Primary | ICD-10-CM

## 2020-04-10 DIAGNOSIS — E78.2 MIXED HYPERLIPIDEMIA: ICD-10-CM

## 2020-04-10 DIAGNOSIS — R00.2 PALPITATIONS: ICD-10-CM

## 2020-04-10 PROCEDURE — 99213 OFFICE O/P EST LOW 20 MIN: CPT | Performed by: INTERNAL MEDICINE

## 2020-04-10 NOTE — PROGRESS NOTES
subjective     Chief Complaint   Patient presents with   • Hypertension     You have chosen to receive care through a telephone visit today. Do you consent to use a telephone visit for your medical care today? Yes      Hypertension   Pertinent negatives include no chest pain, headaches, orthopnea, palpitations, PND or shortness of breath.   Patient is 79 years old white female who is being seen by a telephone visit for multiple chronic medical problems.  Patient has history of essential hypertension, hyperlipidemia and palpitations.  She is taking Toprol-XL 25 mg daily palpitations are better.  Blood pressure is very well controlled with Diovan and hydralazine.  Patient is completely asymptomatic denies any chest pain palpitations or shortness of breath.  She checks her blood pressure at home and heart rate.  Her heart rate today is 60 and blood pressure is 142/82.  She plans to have her lab work done next month.      Past Surgical History:   Procedure Laterality Date   • BREAST BIOPSY Bilateral yrs ago    benign   • BREAST LUMPECTOMY     • CARDIOVASCULAR STRESS TEST  02/10/2016   • ECHO - CONVERTED  02/10/2016   • HYSTERECTOMY      44   • SKIN CANCER EXCISION       Family History   Problem Relation Age of Onset   • Heart attack Mother    • Heart disease Mother    • Heart failure Mother    • Heart attack Father    • Heart disease Father    • Heart failure Father    • No Known Problems Sister    • Emphysema Brother    • Breast cancer Neg Hx      Past Medical History:   Diagnosis Date   • Cancer (CMS/HCC)     skin   • Chronic kidney disease    • GERD (gastroesophageal reflux disease)    • Hyperlipidemia    • Hypertension      Patient Active Problem List   Diagnosis   • Hypertension   • Hyperlipidemia   • GERD (gastroesophageal reflux disease)   • Arthritis   • Osteoporosis   • Left low back pain   • Atypical chest pain   • Bradycardia   • Stage 2 chronic kidney disease   • Palpitations       Social History     Tobacco  Use   • Smoking status: Never Smoker   • Smokeless tobacco: Never Used   Substance Use Topics   • Alcohol use: No   • Drug use: No       Allergies   Allergen Reactions   • Activated Charcoal Itching   • Codeine GI Intolerance   • Sulfa Antibiotics Hives   • Amlodipine Unknown (See Comments)     .cant remember     • Doxycycline Palpitations   • Green Dyes Rash   • Penicillins Rash   • Red Dye Rash       Current Outpatient Medications on File Prior to Visit   Medication Sig   • ALPRAZolam (XANAX) 0.25 MG tablet Take 0.25 mg by mouth at night as needed for anxiety.   • aspirin 81 MG tablet Take 1 tablet by mouth Daily.   • atorvastatin (LIPITOR) 10 MG tablet Take 10 mg by mouth daily.   • Calcium-Magnesium-Vitamin D (CALCIUM 500 PO) Take  by mouth.   • cetirizine (zyrTEC) 10 MG tablet Take 10 mg by mouth Daily.   • cholecalciferol (VITAMIN D3) 1000 UNITS tablet Take 1,000 Units by mouth daily.   • Cyanocobalamin (B-12 IJ) Inject  as directed.   • hydrALAZINE (APRESOLINE) 25 MG tablet Take 1 tablet by mouth 2 (Two) Times a Day.   • HYDROcodone-acetaminophen (NORCO) 5-325 MG per tablet Take 1 tablet by mouth 2 (two) times a day as needed.   • ibandronate (BONIVA) 150 MG tablet Take 150 mg by mouth every 30 (thirty) days.   • metoprolol succinate XL (TOPROL-XL) 25 MG 24 hr tablet Take 1 tablet by mouth Daily.   • ondansetron (ZOFRAN) 4 MG tablet Take 1 tablet by mouth Every 6 (Six) Hours. PRN Nausea/vomiting   • permethrin (ELIMITE) 5 % cream Apply  topically 1 (One) Time.   • traMADol (ULTRAM) 50 MG tablet Take 50 mg by mouth Every 6 (Six) Hours As Needed for Moderate Pain .   • valsartan (DIOVAN) 80 MG tablet Take 2 tablets by mouth Daily.     No current facility-administered medications on file prior to visit.          The following portions of the patient's history were reviewed and updated as appropriate: allergies, current medications, past family history, past medical history, past social history, past surgical  history and problem list.    Review of Systems   Constitution: Negative.   HENT: Negative.  Negative for congestion.    Eyes: Negative.    Cardiovascular: Negative.  Negative for chest pain, cyanosis, dyspnea on exertion, irregular heartbeat, leg swelling, near-syncope, orthopnea, palpitations, paroxysmal nocturnal dyspnea and syncope.   Respiratory: Negative.  Negative for shortness of breath.    Hematologic/Lymphatic: Negative.    Musculoskeletal: Negative.    Gastrointestinal: Negative.    Neurological: Negative.  Negative for headaches.          Objective:     /82 (BP Location: Left arm)   Pulse 60   Physical Exam   Constitutional:   Not done         Lab Review  Lab Results   Component Value Date     07/23/2017    K 4.5 07/23/2017     07/23/2017    BUN 14 07/23/2017    CREATININE 1.07 07/23/2017    GLUCOSE 112 (H) 07/23/2017    CALCIUM 9.7 07/23/2017    ALT 13 07/23/2017    ALKPHOS 56 07/23/2017     Lab Results   Component Value Date    CKTOTAL 64 07/23/2017     Lab Results   Component Value Date    WBC 7.20 07/23/2017    HGB 12.1 07/23/2017    HCT 37.5 07/23/2017     07/23/2017       Lab Results   Component Value Date    BNP 66.0 07/23/2017     No results found for: CHLPL, CHOL, TRIG, HDL, VLDL, LDLHDL  No results found for: LDL    Procedures       I personally viewed and interpreted the patient's LAB data         Assessment:     1. Essential hypertension    2. Palpitations    3. Mixed hyperlipidemia          Plan:     Patient is doing much better blood pressure is very well controlled.  Patient was advised to continue hydralazine and valsartan.  Palpitations are better with metoprolol which was continued.  Healthy lifestyle emphasized.  She is planning to have lab work done next month for hyperlipidemia.  She will continue Lipitor.  Coronavirus precautions were discussed.  Follow-up scheduled  This visit has been rescheduled as a phone visit to comply with patient safety concerns in  accordance with CDC recommendations. Total time of discussion was 20 minutes.          No follow-ups on file.

## 2020-07-21 ENCOUNTER — OFFICE VISIT (OUTPATIENT)
Dept: CARDIOLOGY | Facility: CLINIC | Age: 80
End: 2020-07-21

## 2020-07-21 VITALS
HEIGHT: 62 IN | TEMPERATURE: 98 F | WEIGHT: 133 LBS | HEART RATE: 69 BPM | OXYGEN SATURATION: 98 % | SYSTOLIC BLOOD PRESSURE: 108 MMHG | DIASTOLIC BLOOD PRESSURE: 68 MMHG | BODY MASS INDEX: 24.48 KG/M2

## 2020-07-21 DIAGNOSIS — E78.2 MIXED HYPERLIPIDEMIA: ICD-10-CM

## 2020-07-21 DIAGNOSIS — R00.2 PALPITATIONS: ICD-10-CM

## 2020-07-21 DIAGNOSIS — I10 ESSENTIAL HYPERTENSION: Primary | ICD-10-CM

## 2020-07-21 PROCEDURE — 99214 OFFICE O/P EST MOD 30 MIN: CPT | Performed by: INTERNAL MEDICINE

## 2020-07-21 NOTE — PROGRESS NOTES
subjective     Chief Complaint   Patient presents with   • Hypertension     Follow up, pt has no complaints, has been staying home   • Palpitations     Follow up     History of Present Illness  Patient is 80 years old white female who is here for cardiology follow-up.  She has longstanding history of essential hypertension which is well-controlled with the hydralazine, metoprolol and Diovan.  Blood pressure at home is around 1 30-1 40 systolic.  Blood pressure today is slightly low but patient insisted blood pressure is always around 130 at home.  She denies any dizziness syncope or presyncope.  Palpitations are better with Toprol.  She is taking metoprolol ER 25 mg daily.    She also has hyperlipidemia on Lipitor 10 mg daily.  She plans to have lab work through her PCP next month.  Overall she is doing very well    Past Surgical History:   Procedure Laterality Date   • BREAST BIOPSY Bilateral yrs ago    benign   • BREAST LUMPECTOMY     • CARDIOVASCULAR STRESS TEST  02/10/2016   • ECHO - CONVERTED  02/10/2016   • HYSTERECTOMY      44   • SKIN CANCER EXCISION       Family History   Problem Relation Age of Onset   • Heart attack Mother    • Heart disease Mother    • Heart failure Mother    • Heart attack Father    • Heart disease Father    • Heart failure Father    • No Known Problems Sister    • Emphysema Brother    • Breast cancer Neg Hx      Past Medical History:   Diagnosis Date   • Cancer (CMS/HCC)     skin   • Chronic kidney disease    • GERD (gastroesophageal reflux disease)    • Hyperlipidemia    • Hypertension      Patient Active Problem List   Diagnosis   • Hypertension   • Hyperlipidemia   • GERD (gastroesophageal reflux disease)   • Arthritis   • Osteoporosis   • Left low back pain   • Atypical chest pain   • Bradycardia   • Stage 2 chronic kidney disease   • Palpitations       Social History     Tobacco Use   • Smoking status: Never Smoker   • Smokeless tobacco: Never Used   Substance Use Topics   •  Alcohol use: No   • Drug use: No       Allergies   Allergen Reactions   • Activated Charcoal Itching   • Codeine GI Intolerance   • Sulfa Antibiotics Hives   • Amlodipine Unknown (See Comments)     .cant remember     • Doxycycline Palpitations   • Green Dyes Rash   • Penicillins Rash   • Red Dye Rash       Current Outpatient Medications on File Prior to Visit   Medication Sig   • ALPRAZolam (XANAX) 0.25 MG tablet Take 0.25 mg by mouth at night as needed for anxiety.   • aspirin 81 MG tablet Take 1 tablet by mouth Daily.   • atorvastatin (LIPITOR) 10 MG tablet Take 10 mg by mouth daily.   • Calcium-Magnesium-Vitamin D (CALCIUM 500 PO) Take  by mouth.   • cetirizine (zyrTEC) 10 MG tablet Take 10 mg by mouth Daily.   • cholecalciferol (VITAMIN D3) 1000 UNITS tablet Take 1,000 Units by mouth daily.   • Cyanocobalamin (B-12 IJ) Inject  as directed.   • hydrALAZINE (APRESOLINE) 25 MG tablet Take 1 tablet by mouth 2 (Two) Times a Day.   • ibandronate (BONIVA) 150 MG tablet Take 150 mg by mouth every 30 (thirty) days.   • metoprolol succinate XL (TOPROL-XL) 25 MG 24 hr tablet Take 1 tablet by mouth Daily.   • ondansetron (ZOFRAN) 4 MG tablet Take 1 tablet by mouth Every 6 (Six) Hours. PRN Nausea/vomiting   • permethrin (ELIMITE) 5 % cream Apply  topically 1 (One) Time.   • traMADol (ULTRAM) 50 MG tablet Take 50 mg by mouth Every 6 (Six) Hours As Needed for Moderate Pain .   • valsartan (DIOVAN) 80 MG tablet Take 2 tablets by mouth Daily.   • HYDROcodone-acetaminophen (NORCO) 5-325 MG per tablet Take 1 tablet by mouth 2 (two) times a day as needed.     No current facility-administered medications on file prior to visit.          The following portions of the patient's history were reviewed and updated as appropriate: allergies, current medications, past family history, past medical history, past social history, past surgical history and problem list.    Review of Systems   Constitution: Negative.   HENT: Negative.  Negative  "for congestion.    Eyes: Negative.    Cardiovascular: Negative.  Negative for chest pain, cyanosis, dyspnea on exertion, irregular heartbeat, leg swelling, near-syncope, orthopnea, palpitations, paroxysmal nocturnal dyspnea and syncope.   Respiratory: Negative.  Negative for shortness of breath.    Hematologic/Lymphatic: Negative.    Musculoskeletal: Negative.    Gastrointestinal: Negative.    Neurological: Negative.  Negative for headaches.          Objective:     /68 (BP Location: Left arm, Patient Position: Sitting, Cuff Size: Adult)   Pulse 69   Temp 98 °F (36.7 °C)   Ht 157.5 cm (62\")   Wt 60.3 kg (133 lb)   SpO2 98%   BMI 24.33 kg/m²   Physical Exam   Constitutional: She appears well-developed and well-nourished. No distress.   HENT:   Head: Normocephalic and atraumatic.   Mouth/Throat: Oropharynx is clear and moist. No oropharyngeal exudate.   Eyes: Pupils are equal, round, and reactive to light. Conjunctivae and EOM are normal. No scleral icterus.   Neck: Normal range of motion. Neck supple. No JVD present. No tracheal deviation present. No thyromegaly present.   Cardiovascular: Normal rate, regular rhythm, normal heart sounds and intact distal pulses. PMI is not displaced. Exam reveals no gallop, no friction rub and no decreased pulses.   No murmur heard.  Pulses:       Carotid pulses are 3+ on the right side, and 3+ on the left side.       Radial pulses are 3+ on the right side, and 3+ on the left side.   Pulmonary/Chest: Effort normal and breath sounds normal. No respiratory distress. She has no wheezes. She has no rales. She exhibits no tenderness.   Abdominal: Soft. Bowel sounds are normal. She exhibits no distension, no abdominal bruit and no mass. There is no splenomegaly or hepatomegaly. There is no tenderness. There is no rebound and no guarding.   Musculoskeletal: Normal range of motion. She exhibits no edema, tenderness or deformity.   Lymphadenopathy:     She has no cervical " adenopathy.   Neurological: She is alert. She has normal reflexes. No cranial nerve deficit. She exhibits normal muscle tone. Coordination normal.   Skin: Skin is warm and dry. No rash noted. She is not diaphoretic. No erythema.   Psychiatric: She has a normal mood and affect. Her behavior is normal. Judgment and thought content normal.         Lab Review  Lab Results   Component Value Date     07/23/2017    K 4.5 07/23/2017     07/23/2017    BUN 14 07/23/2017    CREATININE 1.07 07/23/2017    GLUCOSE 112 (H) 07/23/2017    CALCIUM 9.7 07/23/2017    ALT 13 07/23/2017    ALKPHOS 56 07/23/2017     Lab Results   Component Value Date    CKTOTAL 64 07/23/2017     Lab Results   Component Value Date    WBC 7.20 07/23/2017    HGB 12.1 07/23/2017    HCT 37.5 07/23/2017     07/23/2017     No results found for: INR  No results found for: MG  No results found for: PSA, TSH  Lab Results   Component Value Date    BNP 66.0 07/23/2017     No results found for: CHLPL, CHOL, TRIG, HDL, VLDL, LDLHDL  No results found for: LDL    Procedures       I personally viewed and interpreted the patient's LAB data         Assessment:     1. Essential hypertension    2. Palpitations    3. Mixed hyperlipidemia          Plan:     Blood pressure is very well controlled actually it is slightly low today but patient insists her blood pressure is always good and she is totally asymptomatic.  She will continue current medications however if blood pressure drops lower she will decrease her Diovan dose.    Palpitations are very well controlled with Toprol-XL 25 which will be continued.  She also is taking Lipitor and plans to have lab work done through her PCP next month.  Healthy lifestyle emphasized.  She is currently totally asymptomatic.  No change in therapy was made.  Follow-up scheduled        No follow-ups on file.

## 2021-02-12 DIAGNOSIS — Z23 IMMUNIZATION DUE: ICD-10-CM

## 2021-04-01 ENCOUNTER — TRANSCRIBE ORDERS (OUTPATIENT)
Dept: ADMINISTRATIVE | Facility: HOSPITAL | Age: 81
End: 2021-04-01

## 2021-04-01 ENCOUNTER — IMMUNIZATION (OUTPATIENT)
Dept: VACCINE CLINIC | Facility: HOSPITAL | Age: 81
End: 2021-04-01

## 2021-04-01 ENCOUNTER — LAB (OUTPATIENT)
Dept: LAB | Facility: HOSPITAL | Age: 81
End: 2021-04-01

## 2021-04-01 DIAGNOSIS — I10 HYPERTENSION, UNSPECIFIED TYPE: ICD-10-CM

## 2021-04-01 DIAGNOSIS — R53.83 TIREDNESS: ICD-10-CM

## 2021-04-01 DIAGNOSIS — E55.9 VITAMIN D DEFICIENCY: ICD-10-CM

## 2021-04-01 DIAGNOSIS — E53.8 VITAMIN B 12 DEFICIENCY: ICD-10-CM

## 2021-04-01 DIAGNOSIS — R53.83 TIREDNESS: Primary | ICD-10-CM

## 2021-04-01 LAB
25(OH)D3 SERPL-MCNC: 71.9 NG/ML
ALBUMIN SERPL-MCNC: 4.06 G/DL (ref 3.5–5.2)
ALBUMIN/GLOB SERPL: 1.5 G/DL
ALP SERPL-CCNC: 37 U/L (ref 39–117)
ALT SERPL W P-5'-P-CCNC: 13 U/L (ref 1–33)
ANION GAP SERPL CALCULATED.3IONS-SCNC: 12 MMOL/L (ref 5–15)
AST SERPL-CCNC: 16 U/L (ref 1–32)
BASOPHILS # BLD AUTO: 0.07 10*3/MM3 (ref 0–0.2)
BASOPHILS NFR BLD AUTO: 1 % (ref 0–1.5)
BILIRUB SERPL-MCNC: 0.3 MG/DL (ref 0–1.2)
BUN SERPL-MCNC: 16 MG/DL (ref 8–23)
BUN/CREAT SERPL: 11.9 (ref 7–25)
CALCIUM SPEC-SCNC: 10 MG/DL (ref 8.6–10.5)
CHLORIDE SERPL-SCNC: 108 MMOL/L (ref 98–107)
CHOLEST SERPL-MCNC: 142 MG/DL (ref 0–200)
CO2 SERPL-SCNC: 22 MMOL/L (ref 22–29)
CREAT SERPL-MCNC: 1.34 MG/DL (ref 0.57–1)
DEPRECATED RDW RBC AUTO: 50.4 FL (ref 37–54)
EOSINOPHIL # BLD AUTO: 0.08 10*3/MM3 (ref 0–0.4)
EOSINOPHIL NFR BLD AUTO: 1.1 % (ref 0.3–6.2)
ERYTHROCYTE [DISTWIDTH] IN BLOOD BY AUTOMATED COUNT: 13.8 % (ref 12.3–15.4)
FOLATE SERPL-MCNC: 11.9 NG/ML (ref 4.78–24.2)
GFR SERPL CREATININE-BSD FRML MDRD: 38 ML/MIN/1.73
GLOBULIN UR ELPH-MCNC: 2.6 GM/DL
GLUCOSE SERPL-MCNC: 93 MG/DL (ref 65–99)
HCT VFR BLD AUTO: 36.3 % (ref 34–46.6)
HDLC SERPL-MCNC: 57 MG/DL (ref 40–60)
HGB BLD-MCNC: 11.6 G/DL (ref 12–15.9)
IMM GRANULOCYTES # BLD AUTO: 0.01 10*3/MM3 (ref 0–0.05)
IMM GRANULOCYTES NFR BLD AUTO: 0.1 % (ref 0–0.5)
LDLC SERPL CALC-MCNC: 63 MG/DL (ref 0–100)
LDLC/HDLC SERPL: 1.04 {RATIO}
LYMPHOCYTES # BLD AUTO: 1.88 10*3/MM3 (ref 0.7–3.1)
LYMPHOCYTES NFR BLD AUTO: 26.1 % (ref 19.6–45.3)
MCH RBC QN AUTO: 31.4 PG (ref 26.6–33)
MCHC RBC AUTO-ENTMCNC: 32 G/DL (ref 31.5–35.7)
MCV RBC AUTO: 98.4 FL (ref 79–97)
MONOCYTES # BLD AUTO: 0.59 10*3/MM3 (ref 0.1–0.9)
MONOCYTES NFR BLD AUTO: 8.2 % (ref 5–12)
NEUTROPHILS NFR BLD AUTO: 4.56 10*3/MM3 (ref 1.7–7)
NEUTROPHILS NFR BLD AUTO: 63.5 % (ref 42.7–76)
NRBC BLD AUTO-RTO: 0 /100 WBC (ref 0–0.2)
PLATELET # BLD AUTO: 363 10*3/MM3 (ref 140–450)
PMV BLD AUTO: 9.6 FL (ref 6–12)
POTASSIUM SERPL-SCNC: 4 MMOL/L (ref 3.5–5.2)
PROT SERPL-MCNC: 6.7 G/DL (ref 6–8.5)
RBC # BLD AUTO: 3.69 10*6/MM3 (ref 3.77–5.28)
SODIUM SERPL-SCNC: 142 MMOL/L (ref 136–145)
T4 FREE SERPL-MCNC: 1.01 NG/DL (ref 0.93–1.7)
TRIGL SERPL-MCNC: 128 MG/DL (ref 0–150)
TSH SERPL DL<=0.05 MIU/L-ACNC: 1.85 UIU/ML (ref 0.27–4.2)
VIT B12 BLD-MCNC: 1145 PG/ML (ref 211–946)
VLDLC SERPL-MCNC: 22 MG/DL (ref 5–40)
WBC # BLD AUTO: 7.19 10*3/MM3 (ref 3.4–10.8)

## 2021-04-01 PROCEDURE — 80053 COMPREHEN METABOLIC PANEL: CPT

## 2021-04-01 PROCEDURE — 82306 VITAMIN D 25 HYDROXY: CPT

## 2021-04-01 PROCEDURE — 84439 ASSAY OF FREE THYROXINE: CPT

## 2021-04-01 PROCEDURE — 36415 COLL VENOUS BLD VENIPUNCTURE: CPT

## 2021-04-01 PROCEDURE — 84443 ASSAY THYROID STIM HORMONE: CPT

## 2021-04-01 PROCEDURE — 82746 ASSAY OF FOLIC ACID SERUM: CPT

## 2021-04-01 PROCEDURE — 91300 HC SARSCOV02 VAC 30MCG/0.3ML IM: CPT | Performed by: INTERNAL MEDICINE

## 2021-04-01 PROCEDURE — 82607 VITAMIN B-12: CPT

## 2021-04-01 PROCEDURE — 85025 COMPLETE CBC W/AUTO DIFF WBC: CPT

## 2021-04-01 PROCEDURE — 80061 LIPID PANEL: CPT

## 2021-04-01 PROCEDURE — 0001A: CPT | Performed by: INTERNAL MEDICINE

## 2021-04-22 ENCOUNTER — IMMUNIZATION (OUTPATIENT)
Dept: VACCINE CLINIC | Facility: HOSPITAL | Age: 81
End: 2021-04-22

## 2021-04-22 PROCEDURE — 0002A: CPT | Performed by: INTERNAL MEDICINE

## 2021-04-22 PROCEDURE — 91300 HC SARSCOV02 VAC 30MCG/0.3ML IM: CPT | Performed by: INTERNAL MEDICINE

## 2021-07-30 ENCOUNTER — TRANSCRIBE ORDERS (OUTPATIENT)
Dept: ADMINISTRATIVE | Facility: HOSPITAL | Age: 81
End: 2021-07-30

## 2021-07-30 DIAGNOSIS — N18.9 CHRONIC KIDNEY DISEASE, UNSPECIFIED CKD STAGE: Primary | ICD-10-CM

## 2021-08-11 ENCOUNTER — TRANSCRIBE ORDERS (OUTPATIENT)
Dept: ADMINISTRATIVE | Facility: HOSPITAL | Age: 81
End: 2021-08-11

## 2021-08-11 ENCOUNTER — LAB (OUTPATIENT)
Dept: LAB | Facility: HOSPITAL | Age: 81
End: 2021-08-11

## 2021-08-11 ENCOUNTER — HOSPITAL ENCOUNTER (OUTPATIENT)
Dept: ULTRASOUND IMAGING | Facility: HOSPITAL | Age: 81
Discharge: HOME | End: 2021-08-11

## 2021-08-11 DIAGNOSIS — N17.9 ACUTE RENAL FAILURE, UNSPECIFIED ACUTE RENAL FAILURE TYPE (HCC): Primary | ICD-10-CM

## 2021-08-11 DIAGNOSIS — N17.9 ACUTE RENAL FAILURE, UNSPECIFIED ACUTE RENAL FAILURE TYPE (HCC): ICD-10-CM

## 2021-08-11 DIAGNOSIS — N18.9 CHRONIC KIDNEY DISEASE, UNSPECIFIED CKD STAGE: ICD-10-CM

## 2021-08-11 PROCEDURE — 84100 ASSAY OF PHOSPHORUS: CPT

## 2021-08-11 PROCEDURE — 76775 US EXAM ABDO BACK WALL LIM: CPT

## 2021-08-11 PROCEDURE — 82550 ASSAY OF CK (CPK): CPT

## 2021-08-11 PROCEDURE — 36415 COLL VENOUS BLD VENIPUNCTURE: CPT

## 2021-08-11 PROCEDURE — 82306 VITAMIN D 25 HYDROXY: CPT

## 2021-08-11 PROCEDURE — 80053 COMPREHEN METABOLIC PANEL: CPT

## 2021-08-11 PROCEDURE — 76775 US EXAM ABDO BACK WALL LIM: CPT | Performed by: RADIOLOGY

## 2021-08-11 PROCEDURE — 82570 ASSAY OF URINE CREATININE: CPT

## 2021-08-11 PROCEDURE — 81003 URINALYSIS AUTO W/O SCOPE: CPT

## 2021-08-11 PROCEDURE — 85025 COMPLETE CBC W/AUTO DIFF WBC: CPT

## 2021-08-11 PROCEDURE — 84156 ASSAY OF PROTEIN URINE: CPT

## 2021-08-11 PROCEDURE — 83970 ASSAY OF PARATHORMONE: CPT

## 2021-08-12 LAB
25(OH)D3 SERPL-MCNC: 82.8 NG/ML (ref 30–100)
ALBUMIN SERPL-MCNC: 4.3 G/DL (ref 3.5–5.2)
ALBUMIN/GLOB SERPL: 1.5 G/DL
ALP SERPL-CCNC: 43 U/L (ref 39–117)
ALT SERPL W P-5'-P-CCNC: 11 U/L (ref 1–33)
ANION GAP SERPL CALCULATED.3IONS-SCNC: 12.5 MMOL/L (ref 5–15)
AST SERPL-CCNC: 18 U/L (ref 1–32)
BASOPHILS # BLD AUTO: 0.06 10*3/MM3 (ref 0–0.2)
BASOPHILS NFR BLD AUTO: 0.7 % (ref 0–1.5)
BILIRUB SERPL-MCNC: 0.3 MG/DL (ref 0–1.2)
BILIRUB UR QL STRIP: NEGATIVE
BUN SERPL-MCNC: 16 MG/DL (ref 8–23)
BUN/CREAT SERPL: 13.1 (ref 7–25)
CALCIUM SPEC-SCNC: 9.9 MG/DL (ref 8.6–10.5)
CHLORIDE SERPL-SCNC: 106 MMOL/L (ref 98–107)
CK SERPL-CCNC: 87 U/L (ref 20–180)
CLARITY UR: CLEAR
CO2 SERPL-SCNC: 22.5 MMOL/L (ref 22–29)
COLOR UR: YELLOW
CREAT SERPL-MCNC: 1.22 MG/DL (ref 0.57–1)
CREAT UR-MCNC: 33.7 MG/DL
DEPRECATED RDW RBC AUTO: 42.4 FL (ref 37–54)
EOSINOPHIL # BLD AUTO: 0.24 10*3/MM3 (ref 0–0.4)
EOSINOPHIL NFR BLD AUTO: 2.9 % (ref 0.3–6.2)
ERYTHROCYTE [DISTWIDTH] IN BLOOD BY AUTOMATED COUNT: 11.8 % (ref 12.3–15.4)
GFR SERPL CREATININE-BSD FRML MDRD: 42 ML/MIN/1.73
GLOBULIN UR ELPH-MCNC: 2.9 GM/DL
GLUCOSE SERPL-MCNC: 79 MG/DL (ref 65–99)
GLUCOSE UR STRIP-MCNC: NEGATIVE MG/DL
HCT VFR BLD AUTO: 34.9 % (ref 34–46.6)
HGB BLD-MCNC: 11.5 G/DL (ref 12–15.9)
HGB UR QL STRIP.AUTO: NEGATIVE
IMM GRANULOCYTES # BLD AUTO: 0.02 10*3/MM3 (ref 0–0.05)
IMM GRANULOCYTES NFR BLD AUTO: 0.2 % (ref 0–0.5)
KETONES UR QL STRIP: NEGATIVE
LEUKOCYTE ESTERASE UR QL STRIP.AUTO: ABNORMAL
LYMPHOCYTES # BLD AUTO: 2.28 10*3/MM3 (ref 0.7–3.1)
LYMPHOCYTES NFR BLD AUTO: 28 % (ref 19.6–45.3)
MCH RBC QN AUTO: 32.1 PG (ref 26.6–33)
MCHC RBC AUTO-ENTMCNC: 33 G/DL (ref 31.5–35.7)
MCV RBC AUTO: 97.5 FL (ref 79–97)
MONOCYTES # BLD AUTO: 0.78 10*3/MM3 (ref 0.1–0.9)
MONOCYTES NFR BLD AUTO: 9.6 % (ref 5–12)
NEUTROPHILS NFR BLD AUTO: 4.76 10*3/MM3 (ref 1.7–7)
NEUTROPHILS NFR BLD AUTO: 58.6 % (ref 42.7–76)
NITRITE UR QL STRIP: NEGATIVE
NRBC BLD AUTO-RTO: 0 /100 WBC (ref 0–0.2)
PH UR STRIP.AUTO: 6 [PH] (ref 5–8)
PHOSPHATE SERPL-MCNC: 3.1 MG/DL (ref 2.5–4.5)
PLATELET # BLD AUTO: 321 10*3/MM3 (ref 140–450)
PMV BLD AUTO: 11.1 FL (ref 6–12)
POTASSIUM SERPL-SCNC: 3.9 MMOL/L (ref 3.5–5.2)
PROT SERPL-MCNC: 7.2 G/DL (ref 6–8.5)
PROT UR QL STRIP: NEGATIVE
PROT UR-MCNC: 5.1 MG/DL
PROT/CREAT UR: 151.3 MG/G CREA (ref 0–200)
PTH-INTACT SERPL-MCNC: 30.5 PG/ML (ref 15–65)
RBC # BLD AUTO: 3.58 10*6/MM3 (ref 3.77–5.28)
SODIUM SERPL-SCNC: 141 MMOL/L (ref 136–145)
SP GR UR STRIP: 1.01 (ref 1–1.03)
UROBILINOGEN UR QL STRIP: ABNORMAL
WBC # BLD AUTO: 8.14 10*3/MM3 (ref 3.4–10.8)

## 2024-10-08 ENCOUNTER — LAB (OUTPATIENT)
Dept: LAB | Facility: HOSPITAL | Age: 84
End: 2024-10-08
Payer: MEDICARE

## 2024-10-08 ENCOUNTER — TRANSCRIBE ORDERS (OUTPATIENT)
Dept: ADMINISTRATIVE | Facility: HOSPITAL | Age: 84
End: 2024-10-08
Payer: MEDICARE

## 2024-10-08 DIAGNOSIS — E78.5 HYPERLIPIDEMIA, UNSPECIFIED HYPERLIPIDEMIA TYPE: Primary | ICD-10-CM

## 2024-10-08 DIAGNOSIS — E78.5 HYPERLIPIDEMIA, UNSPECIFIED HYPERLIPIDEMIA TYPE: ICD-10-CM

## 2024-10-08 DIAGNOSIS — I10 HYPERTENSION, UNSPECIFIED TYPE: ICD-10-CM

## 2024-10-08 DIAGNOSIS — E55.9 VITAMIN D DEFICIENCY: ICD-10-CM

## 2024-10-08 PROCEDURE — 80061 LIPID PANEL: CPT

## 2024-10-08 PROCEDURE — 84443 ASSAY THYROID STIM HORMONE: CPT

## 2024-10-08 PROCEDURE — 84439 ASSAY OF FREE THYROXINE: CPT

## 2024-10-08 PROCEDURE — 36415 COLL VENOUS BLD VENIPUNCTURE: CPT

## 2024-10-08 PROCEDURE — 82306 VITAMIN D 25 HYDROXY: CPT

## 2024-10-09 LAB
25(OH)D3 SERPL-MCNC: 65.7 NG/ML (ref 30–100)
CHOLEST SERPL-MCNC: 218 MG/DL (ref 0–200)
HDLC SERPL-MCNC: 49 MG/DL (ref 40–60)
LDLC SERPL CALC-MCNC: 143 MG/DL (ref 0–100)
LDLC/HDLC SERPL: 2.85 {RATIO}
T4 FREE SERPL-MCNC: 0.96 NG/DL (ref 0.92–1.68)
TRIGL SERPL-MCNC: 147 MG/DL (ref 0–150)
TSH SERPL DL<=0.05 MIU/L-ACNC: 1.91 UIU/ML (ref 0.27–4.2)
VLDLC SERPL-MCNC: 26 MG/DL (ref 5–40)